# Patient Record
Sex: MALE | Race: WHITE | Employment: PART TIME | URBAN - METROPOLITAN AREA
[De-identification: names, ages, dates, MRNs, and addresses within clinical notes are randomized per-mention and may not be internally consistent; named-entity substitution may affect disease eponyms.]

---

## 2018-08-01 ENCOUNTER — TELEPHONE (OUTPATIENT)
Dept: PHARMACY | Facility: CLINIC | Age: 73
End: 2018-08-01

## 2018-08-01 DIAGNOSIS — Z71.89 ENCOUNTER FOR MEDICATION REVIEW AND COUNSELING: Primary | ICD-10-CM

## 2018-08-01 PROCEDURE — 1111F DSCHRG MED/CURRENT MED MERGE: CPT | Performed by: PHARMACIST

## 2018-08-01 RX ORDER — AMIODARONE HYDROCHLORIDE 200 MG/1
200 TABLET ORAL DAILY
COMMUNITY

## 2018-08-01 RX ORDER — ATENOLOL 25 MG/1
12.5 TABLET ORAL 2 TIMES DAILY
COMMUNITY

## 2018-08-01 RX ORDER — BUSPIRONE HYDROCHLORIDE 10 MG/1
10 TABLET ORAL 2 TIMES DAILY
COMMUNITY

## 2018-08-01 NOTE — TELEPHONE ENCOUNTER
CLINICAL PHARMACY NOTE - Post-Discharge Transitions of Care (DANA)  Patient outreach to review discharge medications and provide medication review and management. Spoke with patient. Non-face-to-face services provided:  Assessment and support for treatment adherence and medication management. SUBJECTIVE/OBJECTIVE:     Cleora Sandhoff is a 71 y.o. male discharged from [facility] on 7/16/18. Medications  Medication Sig Comment     Multiple Vitamins-Minerals (THERAPEUTIC MULTIVITAMIN-MINERALS) tablet Take 1 tablet by mouth daily     predniSONE (DELTASONE) 10 MG tablet Take 1 tablet by mouth 3 times daily as needed (Gout)     tadalafil (CIALIS) 20 MG tablet Take 1 tablet by mouth as needed for Erectile Dysfunction     LORazepam (ATIVAN) 0.5 MG tablet Take 1 tablet by mouth every 6 hours as needed for Anxiety Medication was switched     atenolol (TENORMIN) 25 MG tablet TAKE 1 TABLET TWICE A DAY Patient directed to take a half tablet twice daily     ULORIC 40 MG TABS tablet TAKE 1 TABLET DAILY     indomethacin (INDOCIN) 50 MG capsule Take 1 capsule by mouth as needed. Medication Discontinued    ELIQUIS 5 MG TABS tablet Take 5 mg by mouth 2 times daily.  propafenone (RYTHMOL) 150 MG tablet Take 150 mg by mouth every 8 hours. Medication was switched      Discontinued Medications  · Lorazepam 0.5 mg  · Indomethacin 50 mg   · Propafenone 150 mg     Additional Medications  · Buspirone 10 mg twice daily  · Amiodarone 200 mg twice daily     Allergies  No Known Allergies     Identified Potential Medication Interactions:     No clinically significant interactions identified via [employer] Interaction Analysis as category D or higher. Renal Dosing:     CrCl ~ 56 mL/min  eGFR: 58 mL/min/1.73 m^2  as of 10/20/17  No renal adjustments necessary. ASSESSMENT/PLAN:     Medication reconciliation completed. No voiced concerns or questions. Home medication list updated. No future appointments.     Xavier Pyle PharmD  PGY-1 Pharmacy Resident   8/1/2018  5:02 PM     I have discussed the care of this patient with the resident. I agree with the assessment and plan as documented.      Nigel TaylorD, [facility], [address]  Clinical Pharmacy Specialist  O: [phone]  C: [address] [place] [address]  [phone], Option 7    =======================================================    For Pharmacy Admin Tracking Only  TCM Call Made?: No  [facility] ([facility]) Select Patient?: Yes  Total # of Interventions Recommended: 1 - Updated Order #: 1  Total # Interventions Accepted: 1  Intervention Severity:   - Level 1 Intervention Present?: No   - Level 2 #: 0   - Level 3 #: 0  Outreach Status: Review Complete  Care Coordinator Outreach to Patient?: No  Provider Contacted?: No  Time Spent (min): 30

## 2019-06-30 ENCOUNTER — HOSPITAL ENCOUNTER (EMERGENCY)
Age: 74
Discharge: HOME | End: 2019-06-30
Attending: EMERGENCY MEDICINE
Payer: MEDICARE

## 2019-06-30 VITALS
BODY MASS INDEX: 33.23 KG/M2 | WEIGHT: 225 LBS | RESPIRATION RATE: 16 BRPM | DIASTOLIC BLOOD PRESSURE: 73 MMHG | SYSTOLIC BLOOD PRESSURE: 137 MMHG | HEART RATE: 73 BPM | TEMPERATURE: 98.6 F | OXYGEN SATURATION: 96 %

## 2019-06-30 DIAGNOSIS — L03.031 CELLULITIS OF TOE OF RIGHT FOOT: ICD-10-CM

## 2019-06-30 DIAGNOSIS — S99.921A INJURY OF TOE ON RIGHT FOOT, INITIAL ENCOUNTER: Primary | ICD-10-CM

## 2019-06-30 PROCEDURE — 99283 EMERGENCY DEPT VISIT LOW MDM: CPT

## 2019-06-30 PROCEDURE — 6370000000 HC RX 637 (ALT 250 FOR IP): Performed by: EMERGENCY MEDICINE

## 2019-06-30 RX ORDER — AMOXICILLIN AND CLAVULANATE POTASSIUM 875; 125 MG/1; MG/1
1 TABLET, FILM COATED ORAL 2 TIMES DAILY
Qty: 20 TABLET | Refills: 0 | Status: SHIPPED | OUTPATIENT
Start: 2019-06-30 | End: 2019-07-10

## 2019-06-30 RX ADMIN — Medication 1 EACH: at 10:03

## 2019-06-30 ASSESSMENT — PAIN DESCRIPTION - PAIN TYPE: TYPE: ACUTE PAIN

## 2019-06-30 ASSESSMENT — PAIN DESCRIPTION - LOCATION: LOCATION: TOE (COMMENT WHICH ONE)

## 2019-06-30 ASSESSMENT — PAIN DESCRIPTION - DESCRIPTORS: DESCRIPTORS: DISCOMFORT

## 2019-06-30 ASSESSMENT — PAIN DESCRIPTION - ORIENTATION: ORIENTATION: RIGHT

## 2019-06-30 ASSESSMENT — PAIN SCALES - GENERAL: PAINLEVEL_OUTOF10: 4

## 2022-09-08 ENCOUNTER — HOSPITAL ENCOUNTER (OUTPATIENT)
Age: 77
Setting detail: SPECIMEN
Discharge: HOME | End: 2022-09-08

## 2022-09-08 LAB
ALBUMIN SERPL-MCNC: 4.3 G/DL (ref 3.5–5.2)
ALBUMIN/GLOBULIN RATIO: 1.9 (ref 1–2.5)
ALP BLD-CCNC: 63 U/L (ref 40–129)
ALT SERPL-CCNC: 33 U/L (ref 5–41)
ANION GAP SERPL CALCULATED.3IONS-SCNC: 12 MMOL/L (ref 9–17)
AST SERPL-CCNC: 27 U/L
BILIRUB SERPL-MCNC: 0.5 MG/DL (ref 0.3–1.2)
BUN BLDV-MCNC: 12 MG/DL (ref 8–23)
CALCIUM SERPL-MCNC: 9.1 MG/DL (ref 8.6–10.4)
CHLORIDE BLD-SCNC: 101 MMOL/L (ref 98–107)
CO2: 25 MMOL/L (ref 20–31)
CREAT SERPL-MCNC: 1.05 MG/DL (ref 0.7–1.2)
GFR AFRICAN AMERICAN: >60 ML/MIN
GFR NON-AFRICAN AMERICAN: >60 ML/MIN
GFR SERPL CREATININE-BSD FRML MDRD: ABNORMAL ML/MIN/{1.73_M2}
GLUCOSE BLD-MCNC: 120 MG/DL (ref 70–99)
POTASSIUM SERPL-SCNC: 4.3 MMOL/L (ref 3.7–5.3)
PROSTATE SPECIFIC ANTIGEN: 2.85 NG/ML
SODIUM BLD-SCNC: 138 MMOL/L (ref 135–144)
TOTAL PROTEIN: 6.6 G/DL (ref 6.4–8.3)
URIC ACID: 5.1 MG/DL (ref 3.4–7)

## 2023-01-27 ENCOUNTER — APPOINTMENT (OUTPATIENT)
Dept: GENERAL RADIOLOGY | Age: 78
DRG: 502 | End: 2023-01-27
Payer: COMMERCIAL

## 2023-01-27 ENCOUNTER — ANESTHESIA EVENT (OUTPATIENT)
Dept: OPERATING ROOM | Age: 78
End: 2023-01-27
Payer: COMMERCIAL

## 2023-01-27 ENCOUNTER — APPOINTMENT (OUTPATIENT)
Dept: NON INVASIVE DIAGNOSTICS | Age: 78
DRG: 502 | End: 2023-01-27
Payer: COMMERCIAL

## 2023-01-27 ENCOUNTER — APPOINTMENT (OUTPATIENT)
Dept: MRI IMAGING | Age: 78
DRG: 502 | End: 2023-01-27
Payer: COMMERCIAL

## 2023-01-27 ENCOUNTER — HOSPITAL ENCOUNTER (INPATIENT)
Age: 78
LOS: 2 days | Discharge: HOME | DRG: 502 | End: 2023-01-29
Attending: EMERGENCY MEDICINE | Admitting: STUDENT IN AN ORGANIZED HEALTH CARE EDUCATION/TRAINING PROGRAM
Payer: COMMERCIAL

## 2023-01-27 DIAGNOSIS — S76.112A QUADRICEPS TENDON RUPTURE, LEFT, INITIAL ENCOUNTER: ICD-10-CM

## 2023-01-27 DIAGNOSIS — S76.112A RUPTURE OF LEFT QUADRICEPS MUSCLE, INITIAL ENCOUNTER: Primary | ICD-10-CM

## 2023-01-27 PROBLEM — Z01.810 PREOP CARDIOVASCULAR EXAM: Status: ACTIVE | Noted: 2023-01-27

## 2023-01-27 PROBLEM — I48.0 PAF (PAROXYSMAL ATRIAL FIBRILLATION) (HCC): Status: ACTIVE | Noted: 2023-01-27

## 2023-01-27 PROBLEM — I48.91 ATRIAL FIBRILLATION (HCC): Status: ACTIVE | Noted: 2023-01-27

## 2023-01-27 PROBLEM — Z86.79 HISTORY OF ATRIAL FIBRILLATION: Status: ACTIVE | Noted: 2023-01-27

## 2023-01-27 PROBLEM — G47.33 OSA (OBSTRUCTIVE SLEEP APNEA): Status: ACTIVE | Noted: 2023-01-27

## 2023-01-27 PROBLEM — Z98.890 S/P ABLATION OF ATRIAL FIBRILLATION: Status: ACTIVE | Noted: 2023-01-27

## 2023-01-27 PROBLEM — Z79.01 CHRONIC ANTICOAGULATION: Status: ACTIVE | Noted: 2023-01-27

## 2023-01-27 PROBLEM — Z86.79 S/P ABLATION OF ATRIAL FIBRILLATION: Status: ACTIVE | Noted: 2023-01-27

## 2023-01-27 PROBLEM — I51.7 LEFT VENTRICULAR HYPERTROPHY: Status: ACTIVE | Noted: 2023-01-27

## 2023-01-27 LAB
ANION GAP SERPL CALCULATED.3IONS-SCNC: 10 MMOL/L (ref 9–17)
BUN BLDV-MCNC: 18 MG/DL (ref 8–23)
BUN/CREAT BLD: 19 (ref 9–20)
CALCIUM SERPL-MCNC: 9.6 MG/DL (ref 8.6–10.4)
CHLORIDE BLD-SCNC: 103 MMOL/L (ref 98–107)
CO2: 25 MMOL/L (ref 20–31)
CREAT SERPL-MCNC: 0.97 MG/DL (ref 0.7–1.2)
EKG ATRIAL RATE: 71 BPM
EKG P AXIS: 34 DEGREES
EKG P-R INTERVAL: 166 MS
EKG Q-T INTERVAL: 392 MS
EKG QRS DURATION: 90 MS
EKG QTC CALCULATION (BAZETT): 425 MS
EKG R AXIS: 20 DEGREES
EKG T AXIS: 34 DEGREES
EKG VENTRICULAR RATE: 71 BPM
GFR SERPL CREATININE-BSD FRML MDRD: >60 ML/MIN/1.73M2
GLUCOSE BLD-MCNC: 132 MG/DL (ref 70–99)
HCT VFR BLD CALC: 45.6 % (ref 40.7–50.3)
HEMOGLOBIN: 15.8 G/DL (ref 13–17)
INR BLD: 1.2
LV EF: 65 %
LVEF MODALITY: NORMAL
MCH RBC QN AUTO: 31.7 PG (ref 25.2–33.5)
MCHC RBC AUTO-ENTMCNC: 34.6 G/DL (ref 28.4–34.8)
MCV RBC AUTO: 91.6 FL (ref 82.6–102.9)
NRBC AUTOMATED: 0 PER 100 WBC
PDW BLD-RTO: 12.5 % (ref 11.8–14.4)
PLATELET # BLD: 177 K/UL (ref 138–453)
PMV BLD AUTO: 9.6 FL (ref 8.1–13.5)
POTASSIUM SERPL-SCNC: 4.3 MMOL/L (ref 3.7–5.3)
PROTHROMBIN TIME: 14.8 SEC (ref 11.5–14.2)
RBC # BLD: 4.98 M/UL (ref 4.21–5.77)
SODIUM BLD-SCNC: 138 MMOL/L (ref 135–144)
WBC # BLD: 7 K/UL (ref 3.5–11.3)

## 2023-01-27 PROCEDURE — 93010 ELECTROCARDIOGRAM REPORT: CPT | Performed by: INTERNAL MEDICINE

## 2023-01-27 PROCEDURE — 71045 X-RAY EXAM CHEST 1 VIEW: CPT

## 2023-01-27 PROCEDURE — 99223 1ST HOSP IP/OBS HIGH 75: CPT | Performed by: INTERNAL MEDICINE

## 2023-01-27 PROCEDURE — 1200000000 HC SEMI PRIVATE

## 2023-01-27 PROCEDURE — 93005 ELECTROCARDIOGRAM TRACING: CPT | Performed by: EMERGENCY MEDICINE

## 2023-01-27 PROCEDURE — 6370000000 HC RX 637 (ALT 250 FOR IP): Performed by: STUDENT IN AN ORGANIZED HEALTH CARE EDUCATION/TRAINING PROGRAM

## 2023-01-27 PROCEDURE — 73564 X-RAY EXAM KNEE 4 OR MORE: CPT

## 2023-01-27 PROCEDURE — 36415 COLL VENOUS BLD VENIPUNCTURE: CPT

## 2023-01-27 PROCEDURE — 2580000003 HC RX 258: Performed by: STUDENT IN AN ORGANIZED HEALTH CARE EDUCATION/TRAINING PROGRAM

## 2023-01-27 PROCEDURE — 93306 TTE W/DOPPLER COMPLETE: CPT

## 2023-01-27 PROCEDURE — 99285 EMERGENCY DEPT VISIT HI MDM: CPT

## 2023-01-27 PROCEDURE — 6370000000 HC RX 637 (ALT 250 FOR IP): Performed by: NURSE PRACTITIONER

## 2023-01-27 PROCEDURE — 85027 COMPLETE CBC AUTOMATED: CPT

## 2023-01-27 PROCEDURE — 85610 PROTHROMBIN TIME: CPT

## 2023-01-27 PROCEDURE — 80048 BASIC METABOLIC PNL TOTAL CA: CPT

## 2023-01-27 PROCEDURE — 73721 MRI JNT OF LWR EXTRE W/O DYE: CPT

## 2023-01-27 PROCEDURE — 94761 N-INVAS EAR/PLS OXIMETRY MLT: CPT

## 2023-01-27 RX ORDER — ACETAMINOPHEN 325 MG/1
650 TABLET ORAL EVERY 6 HOURS PRN
Status: DISCONTINUED | OUTPATIENT
Start: 2023-01-27 | End: 2023-01-29 | Stop reason: HOSPADM

## 2023-01-27 RX ORDER — FEBUXOSTAT 40 MG/1
40 TABLET, FILM COATED ORAL DAILY
Status: DISCONTINUED | OUTPATIENT
Start: 2023-01-28 | End: 2023-01-29 | Stop reason: HOSPADM

## 2023-01-27 RX ORDER — SODIUM CHLORIDE 0.9 % (FLUSH) 0.9 %
10 SYRINGE (ML) INJECTION PRN
Status: DISCONTINUED | OUTPATIENT
Start: 2023-01-27 | End: 2023-01-29 | Stop reason: HOSPADM

## 2023-01-27 RX ORDER — POTASSIUM CHLORIDE 20 MEQ/1
40 TABLET, EXTENDED RELEASE ORAL PRN
Status: DISCONTINUED | OUTPATIENT
Start: 2023-01-27 | End: 2023-01-29 | Stop reason: HOSPADM

## 2023-01-27 RX ORDER — SODIUM CHLORIDE 9 MG/ML
INJECTION, SOLUTION INTRAVENOUS CONTINUOUS
Status: DISCONTINUED | OUTPATIENT
Start: 2023-01-27 | End: 2023-01-29

## 2023-01-27 RX ORDER — FAMOTIDINE 20 MG/1
20 TABLET, FILM COATED ORAL 2 TIMES DAILY
Status: DISCONTINUED | OUTPATIENT
Start: 2023-01-27 | End: 2023-01-29 | Stop reason: HOSPADM

## 2023-01-27 RX ORDER — LOSARTAN POTASSIUM 25 MG/1
25 TABLET ORAL DAILY
Status: DISCONTINUED | OUTPATIENT
Start: 2023-01-28 | End: 2023-01-29

## 2023-01-27 RX ORDER — ONDANSETRON 4 MG/1
4 TABLET, ORALLY DISINTEGRATING ORAL EVERY 8 HOURS PRN
Status: DISCONTINUED | OUTPATIENT
Start: 2023-01-27 | End: 2023-01-28 | Stop reason: SDUPTHER

## 2023-01-27 RX ORDER — HYDROCODONE BITARTRATE AND ACETAMINOPHEN 5; 325 MG/1; MG/1
1 TABLET ORAL EVERY 4 HOURS PRN
Status: DISCONTINUED | OUTPATIENT
Start: 2023-01-27 | End: 2023-01-28

## 2023-01-27 RX ORDER — BUSPIRONE HYDROCHLORIDE 5 MG/1
15 TABLET ORAL 2 TIMES DAILY
Status: DISCONTINUED | OUTPATIENT
Start: 2023-01-27 | End: 2023-01-29 | Stop reason: HOSPADM

## 2023-01-27 RX ORDER — SODIUM CHLORIDE 9 MG/ML
INJECTION, SOLUTION INTRAVENOUS PRN
Status: DISCONTINUED | OUTPATIENT
Start: 2023-01-27 | End: 2023-01-29 | Stop reason: HOSPADM

## 2023-01-27 RX ORDER — M-VIT,TX,IRON,MINS/CALC/FOLIC 27MG-0.4MG
1 TABLET ORAL DAILY
Status: DISCONTINUED | OUTPATIENT
Start: 2023-01-28 | End: 2023-01-29 | Stop reason: HOSPADM

## 2023-01-27 RX ORDER — SODIUM CHLORIDE 0.9 % (FLUSH) 0.9 %
10 SYRINGE (ML) INJECTION EVERY 12 HOURS SCHEDULED
Status: DISCONTINUED | OUTPATIENT
Start: 2023-01-27 | End: 2023-01-29 | Stop reason: HOSPADM

## 2023-01-27 RX ORDER — MORPHINE SULFATE 2 MG/ML
2 INJECTION, SOLUTION INTRAMUSCULAR; INTRAVENOUS EVERY 4 HOURS PRN
Status: DISCONTINUED | OUTPATIENT
Start: 2023-01-27 | End: 2023-01-29 | Stop reason: HOSPADM

## 2023-01-27 RX ORDER — POLYETHYLENE GLYCOL 3350 17 G/17G
17 POWDER, FOR SOLUTION ORAL DAILY PRN
Status: DISCONTINUED | OUTPATIENT
Start: 2023-01-27 | End: 2023-01-29 | Stop reason: HOSPADM

## 2023-01-27 RX ORDER — POTASSIUM CHLORIDE 7.45 MG/ML
10 INJECTION INTRAVENOUS PRN
Status: DISCONTINUED | OUTPATIENT
Start: 2023-01-27 | End: 2023-01-29 | Stop reason: HOSPADM

## 2023-01-27 RX ORDER — METOPROLOL SUCCINATE 50 MG/1
TABLET, EXTENDED RELEASE ORAL
COMMUNITY
Start: 2023-01-09

## 2023-01-27 RX ORDER — LOSARTAN POTASSIUM 25 MG/1
25 TABLET ORAL DAILY
Status: ON HOLD | COMMUNITY
Start: 2023-01-08 | End: 2023-01-29 | Stop reason: HOSPADM

## 2023-01-27 RX ORDER — MAGNESIUM SULFATE IN WATER 40 MG/ML
2000 INJECTION, SOLUTION INTRAVENOUS PRN
Status: DISCONTINUED | OUTPATIENT
Start: 2023-01-27 | End: 2023-01-29 | Stop reason: HOSPADM

## 2023-01-27 RX ORDER — ONDANSETRON 2 MG/ML
4 INJECTION INTRAMUSCULAR; INTRAVENOUS EVERY 6 HOURS PRN
Status: DISCONTINUED | OUTPATIENT
Start: 2023-01-27 | End: 2023-01-28 | Stop reason: SDUPTHER

## 2023-01-27 RX ORDER — BUSPIRONE HYDROCHLORIDE 15 MG/1
15 TABLET ORAL 2 TIMES DAILY
COMMUNITY
Start: 2022-11-12

## 2023-01-27 RX ORDER — ACETAMINOPHEN 650 MG/1
650 SUPPOSITORY RECTAL EVERY 6 HOURS PRN
Status: DISCONTINUED | OUTPATIENT
Start: 2023-01-27 | End: 2023-01-29 | Stop reason: HOSPADM

## 2023-01-27 RX ORDER — HYDROCODONE BITARTRATE AND ACETAMINOPHEN 5; 325 MG/1; MG/1
2 TABLET ORAL EVERY 4 HOURS PRN
Status: DISCONTINUED | OUTPATIENT
Start: 2023-01-27 | End: 2023-01-28

## 2023-01-27 RX ORDER — METOPROLOL SUCCINATE 50 MG/1
50 TABLET, EXTENDED RELEASE ORAL DAILY
Status: DISCONTINUED | OUTPATIENT
Start: 2023-01-28 | End: 2023-01-29 | Stop reason: HOSPADM

## 2023-01-27 RX ADMIN — FAMOTIDINE 20 MG: 20 TABLET, FILM COATED ORAL at 13:31

## 2023-01-27 RX ADMIN — HYDROCODONE BITARTRATE AND ACETAMINOPHEN 2 TABLET: 5; 325 TABLET ORAL at 13:31

## 2023-01-27 RX ADMIN — SODIUM CHLORIDE: 9 INJECTION, SOLUTION INTRAVENOUS at 12:02

## 2023-01-27 RX ADMIN — HYDROCODONE BITARTRATE AND ACETAMINOPHEN 2 TABLET: 5; 325 TABLET ORAL at 22:07

## 2023-01-27 RX ADMIN — BUSPIRONE HYDROCHLORIDE 15 MG: 5 TABLET ORAL at 20:46

## 2023-01-27 RX ADMIN — HYDROCODONE BITARTRATE AND ACETAMINOPHEN 2 TABLET: 5; 325 TABLET ORAL at 17:47

## 2023-01-27 RX ADMIN — FAMOTIDINE 20 MG: 20 TABLET, FILM COATED ORAL at 20:46

## 2023-01-27 ASSESSMENT — PAIN SCALES - GENERAL
PAINLEVEL_OUTOF10: 5
PAINLEVEL_OUTOF10: 8
PAINLEVEL_OUTOF10: 7
PAINLEVEL_OUTOF10: 5
PAINLEVEL_OUTOF10: 4
PAINLEVEL_OUTOF10: 0
PAINLEVEL_OUTOF10: 7

## 2023-01-27 ASSESSMENT — PAIN DESCRIPTION - LOCATION
LOCATION: KNEE
LOCATION: KNEE
LOCATION: LEG;KNEE
LOCATION: KNEE
LOCATION: KNEE

## 2023-01-27 ASSESSMENT — PAIN DESCRIPTION - PAIN TYPE
TYPE: ACUTE PAIN

## 2023-01-27 ASSESSMENT — PAIN DESCRIPTION - DESCRIPTORS
DESCRIPTORS: ACHING
DESCRIPTORS: SORE

## 2023-01-27 ASSESSMENT — PAIN - FUNCTIONAL ASSESSMENT
PAIN_FUNCTIONAL_ASSESSMENT: PREVENTS OR INTERFERES SOME ACTIVE ACTIVITIES AND ADLS
PAIN_FUNCTIONAL_ASSESSMENT: 0-10

## 2023-01-27 ASSESSMENT — PAIN DESCRIPTION - FREQUENCY
FREQUENCY: INTERMITTENT
FREQUENCY: CONTINUOUS
FREQUENCY: INTERMITTENT

## 2023-01-27 ASSESSMENT — PAIN DESCRIPTION - ORIENTATION
ORIENTATION: LEFT

## 2023-01-27 NOTE — ACP (ADVANCE CARE PLANNING)
Advance Care Planning     Advance Care Planning Activator (Inpatient)  Conversation Note      Date of ACP Conversation: 1/27/2023     Conversation Conducted with: Patient with Decision Making Capacity    ACP Activator: Jazmine Cortez, [id number] [address] Maker:     Current Designated Health Care Decision Maker:     Primary Decision Maker: Willie Agustin Child - [phone]    Secondary Decision Maker: Karina Lemus - [phone]    Today we documented Decision Maker(s). The patient will provide ACP documents. Care Preferences    Ventilation: \"If you were in your present state of health and suddenly became very ill and were unable to breathe on your own, what would your preference be about the use of a ventilator (breathing machine) if it were available to you? \"      Would the patient desire the use of ventilator (breathing machine)?: yes    \"If your health worsens and it becomes clear that your chance of recovery is unlikely, what would your preference be about the use of a ventilator (breathing machine) if it were available to you? \"     Would the patient desire the use of ventilator (breathing machine)?: No      Resuscitation  \"CPR works best to restart the heart when there is a sudden event, like a heart attack, in someone who is otherwise healthy. Unfortunately, CPR does not typically restart the heart for people who have serious health conditions or who are very sick. \"    \"In the event your heart stopped as a result of an underlying serious health condition, would you want attempts to be made to restart your heart (answer \"yes\" for attempt to resuscitate) or would you prefer a natural death (answer \"no\" for do not attempt to resuscitate)? \" no       [x] Yes   [] No   Educated Patient / Darreld Bosworth regarding differences between Advance Directives and portable DNR orders.     Length of ACP Conversation in minutes:  5    Conversation Outcomes:  [x] ACP discussion completed  [] Existing advance directive reviewed with patient; no changes to patient's previously recorded wishes  [] New Advance Directive completed  [x] Portable Do Not Rescitate prepared for Provider review and signature  [] POLST/POST/MOLST/MOST prepared for Provider review and signature      Follow-up plan:    [] Schedule follow-up conversation to continue planning  [] Referred individual to Provider for additional questions/concerns   [] Advised patient/agent/surrogate to review completed ACP document and update if needed with changes in condition, patient preferences or care setting    [] This note routed to one or more involved healthcare providers

## 2023-01-27 NOTE — PROGRESS NOTES
Comprehensive Nutrition Assessment    Type and Reason for Visit:  Initial    Nutrition Recommendations/Plan:   Continue current diet. Encourage protein foods for healing needs. Malnutrition Assessment:  Malnutrition Status:  Insufficient data (01/27/23 1500)    Context:  Acute Illness     Findings of the 6 clinical characteristics of malnutrition:  Energy Intake:  Unable to assess  Weight Loss:  No significant weight loss     Body Fat Loss:  Unable to assess     Muscle Mass Loss:  Unable to assess    Fluid Accumulation:  Mild Extremities (LLE)   Strength:  Not Performed    Nutrition Assessment:    Increased nutrient needs r/t acute injury/trauma aeb healing needs from pending surgery tomorrow. Glucose 132. No vitamin D level noted, would recommend to obtain one due to risk for deficiency. Pt having testing. Nutrition Related Findings:    unable to assess Wound Type: None       Current Nutrition Intake & Therapies:    Average Meal Intake: Unable to assess  Average Supplements Intake: None Ordered  Diet NPO  ADULT DIET; Regular    Anthropometric Measures:  Height: 6' (182.9 cm)  Ideal Body Weight (IBW): 178 lbs (81 kg)    Admission Body Weight: 240 lb (108.9 kg)  Current Body Weight: 240 lb (108.9 kg), 134.8 % IBW. Weight Source: Stated  Current BMI (kg/m2): 32.5  Usual Body Weight:  (unknown, limited historical weights)     Weight Adjustment For: No Adjustment                 BMI Categories: Obese Class 1 (BMI 30.0-34. 9)    Estimated Daily Nutrient Needs:  Energy Requirements Based On: Kcal/kg  Weight Used for Energy Requirements: Current  Energy (kcal/day): 1107-9244 (18-21)  Weight Used for Protein Requirements: Ideal  Protein (g/day): 105-121g (1.3-1.5g/kg)  Method Used for Fluid Requirements: 1 ml/kcal  Fluid (ml/day): 2300 ml    Nutrition Diagnosis:   Increased nutrient needs related to acute injury/trauma as evidenced by other (comment) (Pending surgery tomorrow)    Nutrition Interventions:   Food and/or Nutrient Delivery: Continue Current Diet  Nutrition Education/Counseling: No recommendation at this time  Coordination of Nutrition Care: Continue to monitor while inpatient  Plan of Care discussed with: no one    Goals:     Goals: Meet at least 75% of estimated needs     Recent Labs     01/27/23  1003      K 4.3      CO2 25   BUN 18   CREATININE 0.97   GLUCOSE 132*      Lab Results   Component Value Date/Time    LABALBU 4.3 09/08/2022 01:50 PM         Nutrition Monitoring and Evaluation:   Behavioral-Environmental Outcomes: None Identified  Food/Nutrient Intake Outcomes: Food and Nutrient Intake  Physical Signs/Symptoms Outcomes: Biochemical Data, Weight, Skin, Fluid Status or Edema    Discharge Planning:    Continue current diet     Olivia Escalona RD, LD  Contact: [phone]

## 2023-01-27 NOTE — CONSULTS
Spoke with Beth Carr at the bedside, he believes that he has a DNR order. After discussion states that he completed a [facility] with an  but never had a DNR order signed by a physician. Discussed full code vs DNR-CCA vs DNR-CC. States that he wishes to be a DNR-CCA at this time. Document signed by patient and left with primary nurse for physician review. Denies further needs at this time. Copy of [facility] requested when he is able.      [address]marie Woodson Nurse Coordinator  1/27/2023 4:15 PM

## 2023-01-27 NOTE — ED PROVIDER NOTES
[facility] ED  Emergency Department Encounter  Emergency Medicine Resident     Pt Name:Nicolas Flores  MRN: [id number]  [place] 1945  Date of evaluation: 1/27/23  PCP:  Rina Ruffin MD      [address] [place] [address]       Chief Complaint   Patient presents with    Knee Pain     Slipped and fell on ice injuring left knee. FROI       HISTORY OF PRESENT ILLNESS  (Location/Symptom, Timing/Onset, Context/Setting, Quality, Duration, Modifying Factors, Severity.)      Melly South is a 69 y.o. male who presents with mechanical slip and fall on ice. Landing directly on his left knee, brought in by EMS, has deformity noted, patient unable to extend at the left knee. Patient has a h/o atrial fibrillation, had ablation done 3 years ago, with success, is on eliquis. Did not his his head, and no LOC,    PAST MEDICAL / SURGICAL / SOCIAL / FAMILY HISTORY      has a past medical history of Erectile dysfunction, Gout, Hypertension, Obesity, and Unspecified sleep apnea. has a past surgical history that includes Colonoscopy and eye surgery (Left, 2010). Social History     Socioeconomic History    Marital status:      Spouse name: Not on file    Number of children: Not on file    Years of education: Not on file    Highest education level: Not on file   Occupational History    Not on file   Tobacco Use    Smoking status: Never    Smokeless tobacco: Never   Substance and Sexual Activity    Alcohol use:  Yes     Alcohol/week: 7.0 standard drinks     Types: 7 Standard drinks or equivalent per week    Drug use: No    Sexual activity: Not on file   Other Topics Concern    Not on file   Social History Narrative    Not on file     Social Determinants of Health     Financial Resource Strain: Not on file   Food Insecurity: Not on file   Transportation Needs: Not on file   Physical Activity: Not on file   Stress: Not on file   Social Connections: Not on file   Intimate Partner Violence: Not on file   Housing Stability: Not on file       Family History   Problem Relation Age of Onset    Arthritis Mother     High Blood Pressure Mother     Diabetes Father     High Blood Pressure Father     Arthritis Sister     Arthritis Sister        Allergies:  Diltiazem hcl    Home Medications:  Prior to Admission medications    Medication Sig Start Date End Date Taking? Authorizing Provider   atenolol (TENORMIN) 25 MG tablet Take 12.5 mg by mouth 2 times daily    Historical Provider, MD   busPIRone (BUSPAR) 10 MG tablet Take 10 mg by mouth 2 times daily    Historical Provider, MD   amiodarone (CORDARONE) 200 MG tablet Take 200 mg by mouth daily    Historical Provider, MD   Multiple Vitamins-Minerals (THERAPEUTIC MULTIVITAMIN-MINERALS) tablet Take 1 tablet by mouth daily    Historical Provider, MD   predniSONE (DELTASONE) 10 MG tablet Take 1 tablet by mouth 3 times daily as needed (Gout) 10/11/16   Que Tim MD   tadalafil (CIALIS) 20 MG tablet Take 1 tablet by mouth as needed for Erectile Dysfunction 9/1/16   Que Tim MD   ULORIC 40 MG TABS tablet TAKE 1 TABLET DAILY 5/13/16   Que Tim MD   ELIQUIS 5 MG TABS tablet Take 5 mg by mouth 2 times daily. 8/25/14   Historical Provider, MD         REVIEW OF SYSTEMS       Review of Systems   Musculoskeletal:  Positive for arthralgias, gait problem and joint swelling. PHYSICAL EXAM      INITIAL VITALS:   BP (!) 172/77   Pulse 81   Temp 97.6 °F (36.4 °C) (Oral)   Resp 16   SpO2 99%     Physical Exam  Constitutional:       Comments: Patient moving bilateral UE without difficulty and RLE without difficulty, no midline cervical thoracic or lumbar ttp. HENT:      Head: Normocephalic and atraumatic. Pulmonary:      Effort: Pulmonary effort is normal.   Abdominal:      General: There is no distension. Palpations: Abdomen is soft. There is no mass. Tenderness: There is no abdominal tenderness. There is no right CVA tenderness, left CVA tenderness, guarding or rebound.       Hernia: No hernia is present. Musculoskeletal:      Comments: Patient with defomity noted to the superior aspect of the patella with depression noted, patient is able to flex at his knee but unable to perform SLR, and unable to extend at the knee. There is some eccymosis developing on the distal medial thigh. Patella is low, and non tender to palpation, 2+ pedal pulse and SILT. DDX/DIAGNOSTIC RESULTS / EMERGENCY DEPARTMENT COURSE / MDM     Medical Decision Making  Quadriceps rupture likely complete, will plan on knee immobilization. Crutches, pain control, check xray to r/o fracture, patient does not seem to have additional injuries. Will speak with ortho regarding management      Amount and/or Complexity of Data Reviewed  Radiology: ordered. All EKG's are interpreted by the Emergency Department Physician who either signs or Co-signs this chart in the absence of a cardiologist.      ED Course as of 01/27/23 0956   Fri Jan 27, 2023   0925 XR KNEE LEFT (MIN 4 VIEWS)  Will speak with Dr. Chantel De La Cruz regarding management  [CE]   9698 Spoke with Dr. Chantel De La Cruz and discussed care, plan for admission and knee immobilizer, and preop labs and ekg, patient will need medical clearance. [CE]   [id number] Spoke with Dr. Billie Banks who accepted the patient for admisison [CE]   0956 EKG shows normal sinus rhythm with normal axis no ST elevations or depressions noted. Q waves noted in lead III. Ventricular rate of 71 KS interval of 166 QRS of 90 and a QT corrected of 425 [CE]      ED Course User Index  [address] [place] [address] DO Salma     pATIENT AND WIFE UPDATED ON RESULTS AND PLAN FOR ADMISSION AND LIKELY OR TOMORROW. CONSULTS:  IP CONSULT TO SOCIAL WORK  IP CONSULT TO ORTHOPEDIC SURGERY        FINAL IMPRESSION      1. Rupture of left quadriceps muscle, initial encounter          DISPOSITION / Westley [address] Admitted 01/27/2023 09:39:21 AM      PATIENT REFERRED TO:  No follow-up provider specified.     DISCHARGE MEDICATIONS:  New Prescriptions    No medications on file       Sagar Westbrook DO  Emergency Medicine Resident    (Please note that portions of thisnote were completed with a voice recognition program.  Efforts were made to edit the dictations but occasionally words are mis-transcribed.)        Barbie Cam DO  01/27/23 5412

## 2023-01-27 NOTE — ED NOTES
Left message for Dr Darci Ye ortho on call per Dr. Woodruff Julia request.     Nina DOUGLASS Reser  01/27/23 9319

## 2023-01-27 NOTE — CONSULTS
Department of Orthopedic Surgery  Attending Consult Note        Reason for Consult:  Left quadriceps tendon rupture     CHIEF COMPLAINT:  Fall, left knee pain    History Obtained From:  patient    HISTORY OF PRESENT ILLNESS:                The patient is a 69 y.o. male who presented to the ER earlier today after a fall from slipping on the ice. Patient had immediate left knee pain and inability to ambulate/elevate the knee. Clinical exam was consistent with quadricep tendon rupture. X-rays were negative for fracture, but demonstrated soft tissue changes and concern for quadriceps tendon rupture. Patient was subsequently admitted, MRI ordered. Patient normally is very ambulatory, walking several miles per day. He states that overall he is active. He denies any significant prior left knee issues. He denies any other known injuries at this time. Patient lives with his wife.     Past Medical History:        Diagnosis Date    Atrial fibrillation (HCC)     Erectile dysfunction     Gout     Hypertension     Obesity     SHELIA (obstructive sleep apnea)     PAF (paroxysmal atrial fibrillation) ([facility] [address]     Unspecified sleep apnea      Past Surgical History:        Procedure Laterality Date    CARDIAC ELECTROPHYSIOLOGY STUDY AND ABLATION  03/2020    [facility]    COLONOSCOPY      EYE SURGERY Left 01/01/2010    detached retina     Current Medications:   Current Facility-Administered Medications: 0.9 % sodium chloride infusion, , IntraVENous, Continuous  sodium chloride flush 0.9 % injection 10 mL, 10 mL, IntraVENous, 2 times per day  sodium chloride flush 0.9 % injection 10 mL, 10 mL, IntraVENous, PRN  0.9 % sodium chloride infusion, , IntraVENous, PRN  ondansetron (ZOFRAN-ODT) disintegrating tablet 4 mg, 4 mg, Oral, Q8H PRN **OR** ondansetron (ZOFRAN) injection 4 mg, 4 mg, IntraVENous, Q6H PRN  polyethylene glycol (GLYCOLAX) packet 17 g, 17 g, Oral, Daily PRN  famotidine (PEPCID) tablet 20 mg, 20 mg, Oral, BID  acetaminophen (TYLENOL) tablet 650 mg, 650 mg, Oral, Q6H PRN **OR** acetaminophen (TYLENOL) suppository 650 mg, 650 mg, Rectal, Q6H PRN  potassium chloride (KLOR-CON M) extended release tablet 40 mEq, 40 mEq, Oral, PRN **OR** potassium bicarb-citric acid (EFFER-K) effervescent tablet 40 mEq, 40 mEq, Oral, PRN **OR** potassium chloride 10 mEq/100 mL IVPB (Peripheral Line), 10 mEq, IntraVENous, PRN  magnesium sulfate 2000 mg in 50 mL IVPB premix, 2,000 mg, IntraVENous, PRN  busPIRone (BUSPAR) tablet 15 mg, 15 mg, Oral, BID  [Held by provider] apixaban (ELIQUIS) tablet 5 mg, 5 mg, Oral, BID  [START ON 1/28/2023] losartan (COZAAR) tablet 25 mg, 25 mg, Oral, Daily  [START ON 1/28/2023] metoprolol succinate (TOPROL XL) extended release tablet 50 mg, 50 mg, Oral, Daily  [START ON 1/28/2023] therapeutic multivitamin-minerals 1 tablet, 1 tablet, Oral, Daily  [START ON 1/28/2023] febuxostat (ULORIC) tablet 40 mg (PATIENT SUPPLIED) (Patient Supplied), 40 mg, Oral, Daily  HYDROcodone-acetaminophen (NORCO) 5-325 MG per tablet 1 tablet, 1 tablet, Oral, Q4H PRN **OR** HYDROcodone-acetaminophen (NORCO) 5-325 MG per tablet 2 tablet, 2 tablet, Oral, Q4H PRN  morphine (PF) injection 2 mg, 2 mg, IntraVENous, Q4H PRN  Allergies:  Diltiazem hcl    Social History:   Lives with wife    Family History:       Problem Relation Age of Onset    Arthritis Mother     High Blood Pressure Mother     Diabetes Father     High Blood Pressure Father     Arthritis Sister     Arthritis Sister      REVIEW OF SYSTEMS:    Negative except as above    PHYSICAL EXAM:    VITALS:  BP (!) 163/90   Pulse 77   Temp 97.1 °F (36.2 °C) (Temporal)   Resp 20   Ht 6' (1.829 m)   Wt 240 lb (108.9 kg)   SpO2 98%   BMI 32.55 kg/m²     General: Patient is alert and oriented. No acute distress. Resting comfortably in the hospital bed. Left knee: Immobilizer was opened. No evidence for any open injury or soft tissue abrasion. Obvious joint effusion noted. Defect palpable at the superior patella/distal quadriceps tendon. Patient unable to elevate the leg and extend the knee. Sensation intact distally in the saphenous, sural, tibial, superficial/deep peroneal nerves. Motor function intact with ankle dorsiflexion/plantarflexion, wiggling the toes. Distal pulses are intact. Compartments are soft and compressible. DATA:    X-ray left knee:  1. No acute osseous abnormality. 2.  Irregular suprapatellar soft tissues and distal quadriceps tendon shadow,   concerning for soft tissue injury, possible distal quadriceps tendon tear. Correlation with physical exam is recommended. If there is concern for   quadriceps tendon rupture, consider MRI. MRI left knee:  1. Complete rupture of the left distal quadriceps tendon with approximately 2   cm retraction and pronounced surrounding soft tissue edema and confluent   fluid within the anterior soft tissues. Associated muscular strains of the   distal quadriceps musculature also noted. Underlying tendinosis of the   extensor mechanism as well as avulsion fracture of the enthesophyte at the   quadriceps insertion on the patella. 2. Mildly complex tear of the anterior horn body junction of the lateral   meniscus with extension into the anterior root attachment. 3. Knee effusion. 4. Mild tricompartmental osteoarthritis       IMPRESSION/RECOMMENDATIONS:    1. Acute left distal quadricep tendon rupture    Diagnosis for this patient was discussed with him in detail. Imaging has been reviewed. Labs were reviewed. Patient has a left distal quadricep tendon rupture with retinacular disruption. We discussed surgical and nonsurgical treatment options. Surgical treatment is recommended with left distal quadricep tendon repair. We discussed risks, benefits, and alternatives to surgery.   Risks to surgery include bleeding, infection, damage to showing neurovascular and soft tissue structures, pain, stiffness, weakness, implant failure, need for further treatment, adverse reaction anesthesia, DVT/PE, rerupture, loss of limb, loss of life. The patient had all questions answered to his satisfaction, and he elected to proceed with surgery. Written and informed consent was obtained. We we will plan to proceed with surgical treatment tomorrow morning pending medical clearance. Cardiac clearance has been ordered. Patient also has evidence of meniscus tears seen on MRI. These can be addressed at a later date if needed. Hold Eliquis  N.p.o. after midnight  Maintain knee in extension    Please call with any questions or concerns.

## 2023-01-27 NOTE — ED NOTES
Increased swelling noted on left knee, ice applied to affected area.      Ilene Gomez RN  01/27/23 1024

## 2023-01-27 NOTE — H&P
History and Physical    Patient:  Jory Dupont  MRN: [id number]    Chief Complaint:  left knee pain    History Obtained From:  patient, electronic medical record    PCP: Gabriel Luis MD    History of Present Illness: The patient is a 69 y.o. male who presented to the ER with complaints of left knee pain after sustaining a fall. He stated he slipped on the ice and fell directly on the left knee. EMS transported him and was found to have a deformity and unable to extend at the knee level or bare weight. He denied LOC. He is on Eliquis for Afib and has history of HTN. Radiology studies were concerning for left Quad Rupture and Dr. Addison Wan was called by ER . Left leg placed in a brace and admitted    Past Medical History:        Diagnosis Date    Atrial fibrillation [facility])     Erectile dysfunction     Gout     Hypertension     Obesity     Unspecified sleep apnea        Past Surgical History:        Procedure Laterality Date    CARDIAC ELECTROPHYSIOLOGY STUDY AND ABLATION  03/2020    [facility]    COLONOSCOPY      EYE SURGERY Left 01/01/2010    detached retina       Medications Prior to Admission:    Prior to Admission medications    Medication Sig Start Date End Date Taking?  Authorizing Provider   metoprolol succinate (TOPROL XL) 50 MG extended release tablet TAKE 1 TABLET DAILY 1/9/23  Yes Historical Provider, MD   losartan (COZAAR) 25 MG tablet Take 25 mg by mouth daily 1/8/23  Yes Historical Provider, MD   busPIRone (BUSPAR) 15 MG tablet Take 15 mg by mouth 2 times daily 11/12/22  Yes Historical Provider, MD   Multiple Vitamins-Minerals (THERAPEUTIC MULTIVITAMIN-MINERALS) tablet Take 1 tablet by mouth daily    Historical Provider, MD   predniSONE (DELTASONE) 10 MG tablet Take 1 tablet by mouth 3 times daily as needed (Gout) 10/11/16   Gabriel Luis MD   tadalafil (CIALIS) 20 MG tablet Take 1 tablet by mouth as needed for Erectile Dysfunction 9/1/16   Gabriel Luis MD   ULORIC 40 MG TABS tablet TAKE 1 TABLET DAILY 5/13/16   Vincent Joyner MD   ELIQUIS 5 MG TABS tablet Take 5 mg by mouth 2 times daily. 8/25/14   Historical Provider, MD       Allergies:  Diltiazem hcl    Social History:   TOBACCO:   reports that he has never smoked. He has never used smokeless tobacco.  ETOH:   reports current alcohol use of about 7.0 standard drinks per week. Family History:       Problem Relation Age of Onset    Arthritis Mother     High Blood Pressure Mother     Diabetes Father     High Blood Pressure Father     Arthritis Sister     Arthritis Sister        Allergies:  Diltiazem hcl    Medications Prior to Admission:    Prior to Admission medications    Medication Sig Start Date End Date Taking? Authorizing Provider   metoprolol succinate (TOPROL XL) 50 MG extended release tablet TAKE 1 TABLET DAILY 1/9/23  Yes Historical Provider, MD   losartan (COZAAR) 25 MG tablet Take 25 mg by mouth daily 1/8/23  Yes Historical Provider, MD   busPIRone (BUSPAR) 15 MG tablet Take 15 mg by mouth 2 times daily 11/12/22  Yes Historical Provider, MD   Multiple Vitamins-Minerals (THERAPEUTIC MULTIVITAMIN-MINERALS) tablet Take 1 tablet by mouth daily    Historical Provider, MD   predniSONE (DELTASONE) 10 MG tablet Take 1 tablet by mouth 3 times daily as needed (Gout) 10/11/16   Vincent Joyner MD   tadalafil (CIALIS) 20 MG tablet Take 1 tablet by mouth as needed for Erectile Dysfunction 9/1/16   Vincent Joyner MD   ULORIC 40 MG TABS tablet TAKE 1 TABLET DAILY 5/13/16   Vincent Joyner MD   ELIQUIS 5 MG TABS tablet Take 5 mg by mouth 2 times daily. 8/25/14   Historical Provider, MD       Review of Systems:  Constitutional:negative  for fevers, and negative for chills.   Eyes: negative for visual disturbance   ENT: negative for sore throat, negative nasal congestion, and negative for earache  Respiratory: negative for shortness of breath, negative for cough, and negative for wheezing  Cardiovascular: negative for chest pain, negative for palpitations, and negative for syncope  Gastrointestinal: negative for abdominal pain, negative for nausea,negative for vomiting, negative for diarrhea, negative for constipation, and negative for hematochezia or melena  Genitourinary: negative for dysuria, negative for urinary urgency, negative for urinary frequency, and negative for hematuria  Skin: negative for skin rash, and negative for skin lesions  Neurological: negative for unilateral weakness, numbness or tingling. Physical Exam:    Vitals:   Temp: 97.1 °F (36.2 °C)  BP: (!) 163/90  Resp: 20  Heart Rate: 77  SpO2: 98 %  24HR INTAKE/OUTPUT:    Intake/Output Summary (Last 24 hours) at 1/27/2023 1208  Last data filed at 1/27/2023 1152  Gross per 24 hour   Intake --   Output 425 ml   Net -425 ml       Weight      Body mass index is 32.55 kg/m². Exam:  GEN:    Awake, alert and oriented x3. EYES:  EOMI, pupils equal   NECK: Supple. No lymphadenopathy. No carotid bruit  CVS:    regular rate and rhythm, no audible murmur  PULM:  CTA, no wheezes, rales or rhonchi, no acute respiratory distress  ABD:    Bowels sounds normal.  Abdomen is soft. No distention. no tenderness to palpation. EXT:   no edema bilaterally . No calf tenderness. [place] Brace in place to LLE, pedal pulses palpable  NEURO: Moves all extremities. Motor and sensory are grossly intact  SKIN:  No rashes.   No skin lesions.    -----------------------------------------------------------------  Diagnostic Data:     DATA:    CBC:   Lab Results   Component Value Date    WBC 7.0 01/27/2023    RBC 4.98 01/27/2023    HGB 15.8 01/27/2023    HCT 45.6 01/27/2023    MCV 91.6 01/27/2023     01/27/2023        CMP:   Lab Results   Component Value Date    GLUCOSE 132 (H) 01/27/2023    BUN 18 01/27/2023    CREATININE 0.97 01/27/2023     01/27/2023    K 4.3 01/27/2023    CALCIUM 9.6 01/27/2023     01/27/2023    CO2 25 01/27/2023    PROT 6.6 09/08/2022    LABALBU 4.3 09/08/2022    BILITOT 0.5 09/08/2022    ALKPHOS 63 09/08/2022    ALT 33 09/08/2022    AST 27 09/08/2022       UA:   No results found for: COLORU, CLARITYU, SPECGRAV, WBCUA, RBCUA, EPITHUA, LEUKOCYTESUR, GLUCOSEU, BLOODU, KETUA, PROTEINU, HGBUR, CASTUA, CRYSTUA, BACTERIA, YEAST    Lactic Acid:   No results found for: LACTA    D-Dimer:  No results found for: DDIMER    PT/INR:  Lab Results   Component Value Date/Time    PROTIME 14.8 01/27/2023 10:03 AM    INR 1.2 01/27/2023 10:03 AM       High Sensitivity Troponin:  No results for input(s): TROPHS in the last 72 hours. ABGs:   No results found for: PHART, PH, BHE4ESE, PCO2, PO2ART, PO2, QKU3IHH, HCO3, BEART, BE, THGBART, THB, MZR1UQY, X5VCDJYB, O2SAT, FIO2        XR KNEE LEFT (MIN 4 VIEWS)   Preliminary Result   1. No acute osseous abnormality. 2.  Irregular suprapatellar soft tissues and distal quadriceps tendon shadow,   concerning for soft tissue injury, possible distal quadriceps tendon tear. Correlation with physical exam is recommended. If there is concern for   quadriceps tendon rupture, consider MRI. Assessment:    Principal Problem:    Quadriceps tendon rupture, left, initial encounter  Active Problems:    PAF (paroxysmal atrial fibrillation) ([facility])    SHELIA (obstructive sleep apnea)    Essential hypertension  Resolved Problems:    * No resolved hospital problems.  *      Patient Active Problem List    Diagnosis Date Noted    Quadriceps tendon rupture, left, initial encounter 01/27/2023    PAF (paroxysmal atrial fibrillation) ([facility] [address] 01/27/2023    SHELIA (obstructive sleep apnea) 01/27/2023    Essential hypertension 09/03/2015    Gout 02/01/2013    Palpitations 02/01/2013    ED (erectile dysfunction) 02/01/2013       Plan:     MEDICAL DECISION MAKING:    Primary Problem(s): Quadriceps tendon rupture, left, initial encounter  Differential diagnoses: fracture  Condition is an undiagnosed new problem with uncertain prognosis  Condition is stable  Treatment plan: Consultation:  Mishel  Imaging: MRI left knee WO contrast ordered  Medications:   Hold Eliquis  Norco and MS for pain  Medication Monitoring / High Risk Medications: none   Immobilizer in place  NPO after midnight     PAF    Condition is a chronic stable condition  Treatment plan: Consultation: Cardiology for surgery clearance  Imaging: Echo ordered  Medications:   Continue Toprol XL  Hold Eliquis for Surgery      HTN    Condition is a chronic stable condition  Treatment plan: Continue current treatment  Imaging: no further imaging studies ordered today  Medications:   Continue Losartan, Toprol XL    Nutrition status:   obesity, non-morbid  Dietician consult initiated    Hospital Prophylaxis:   DVT:  Eliquis/SCD    Stress Ulcer: H2 Blocker     MDM Data:   Test interpretation:  My independent EKG interpretation: normal sinus rhythm  My independent X-ray interpretation:  CXR shows no acute process  Management and/or test interpretation discussed with ER MD at time of admission  Consults and Nursing notes were personally reviewed, all current labs and imaging were personally reviewed, tests ordered: CBC, BMP, and history obtained by independent historian       Disposition:  Shared decision making: All test results, treatment options and disposition options were discussed with the patient today  Social determinants of health that may impact management: none  Code status: Full Code   Disposition: Discharge plan is pending        Critical Care Time:  Total critical care time caring for this patient with life threatening, unstable organ failure, including direct patient contact, management of life support systems, review of data including imaging and labs, discussions with other team members and physicians at least 0 minutes so far today, excluding separately billable procedures.       [facility] Medication Reconciliation documentation:    [x] I have utilized all available immediate resources to obtain, update, or review the patient's current medications (including all prescriptions, over-the-counter products, herbals, cannabinoid products and bitamin/mineral/dietary/nutritional supplements. Jamshid.Havers 'yes\", Edwin Marlow     []  The patient is not eligible for medication reconciliation; the patient is in an emergent medical situation where delaying treatment would jeopardize the patient's health    []  I did NOT confirm, update or review the patient's current list of medications today. [DOES NOT SATISFY [facility] PERFORMANCE]        [facility] Advanced Care Planning documentation:  [x] I have confirmed that the patient's Advance Care Plan is present, Code Status is documented, or surrogate decision maker is listed in the patient's medical record  [If \"yes\", STOP HERE]     [] The patient's Advance Care Plan is NOT present because:    []  I confirmed today that the patient does not wish or was not able to name a   surrogate decision maker or provide and advance care plan.    [] Hospice care is currently being provided or has been provided within the   calendar year. []  I did NOT confirm today the presence of an [address] or surrogate   decision maker documented within the patient's medical record. [DOES NOT SATISFY [facility] PERFORMANCE]    CORE MEASURES  DVT prophylaxis:  SCD; Eliquis  Decubitus ulcer present on admission: No  CODE STATUS: FULL CODE  Nutrition Status: good   Physical therapy: Yes   Old Charts reviewed: Yes  EKG Reviewed:  Yes  Advance Directive Addressed: Yes    ZAIRA Kumar CNP, ZAIRA, NP-C  1/27/2023, 12:08 PM

## 2023-01-27 NOTE — PROGRESS NOTES
[facility]  Inpatient/Observation/Outpatient Rehabilitation    Date: 2023  Patient Name: Natacha Rice       [x] Inpatient Acute/Observation       []  Outpatient  : 1945       [] Pt no showed for scheduled appointment    [] Pt refused/declined therapy at this time due to:           [x] Pt cancelled due to:  [] No Reason Given   [] Sick/ill   [] Other: Pt getting MRI and to have surgery tomorrow. Therapist/Assistant will attempt to see this patient, at our earliest opportunity.        Lenard Coleman, PT, DPT Date: 2023

## 2023-01-27 NOTE — PROGRESS NOTES
[facility]  Inpatient/Observation/Outpatient Rehabilitation    Date: 2023  Patient Name: Gene Roa       [x] Inpatient Acute/Observation       []  Outpatient  : 1945       [] Pt no showed for scheduled appointment    [] Pt refused/declined therapy at this time due to:           [x] Pt not seen due to:  [] No Reason Given   [] Sick/ill   [] Other: Pt out of room for an MRI at this time and will have surgery tomorrow. Therapist/Assistant will attempt to see this patient, at our earliest opportunity.        Valdez Olvera, OT Date: 2023

## 2023-01-27 NOTE — PROGRESS NOTES
Spoke to Dr. Chantel De La Cruz about pt, states he can eat today and NPO at midnight. Needs MRI ordered and medical clearance. Told pt he could eat if he wanted, not hungry. Told him I will put a diet order in if he wants to eat yet today. Spoke to CNP about not seeing an MRI ordered at this time she is going to order it. Inpatient consult called to Cardiology for surgical clearance.

## 2023-01-27 NOTE — PROGRESS NOTES
Pt arrived to floor via ER bed, was able to scoot over to room bed with minimal assistance. Immobilizer in place. Pt wanted to use urinal as soon as he got in room, he was able to stand alone and side of bed on R leg and use urinal. Admission nurse in right after to do navigator. SCD's applied, IV fluids started. Call light and personal belongings in reach, introduced to white board, and staff. Will continue to monitor.

## 2023-01-27 NOTE — CONSULTS
Griselda Rutledge am scribing for and in the presence of Lena Lane MD, F.A.C.C..    Patient: Kathy Harris  : 1945  Date of Admission: 2023  Primary Care Physician: Enedelia Green  Today's Date: 2023    REASON FOR CONSULTATION: Knee Pain (Slipped and fell on ice injuring left knee. FROI)      History of Present Illness:           I had the pleasure of seeing your patient Kathy Harris as part of a pre-operative cardiac evaluation today. He is planning on having knee surgery done tomorrow. He today fell down while in the parking lot on a sheet of ice. EMS was called. He denied any loss of consciousness. He does have a history of A fib ablation x2 one done at [facility] and the 2nd done at the [facility] back in 2019. He is maintaining sinus rhythm since his last ablation. He has never had a coronary angiography done. No history of CAD, myocardial infarction or heart failure. He had history of unremarkable stress test prior to his first ablation. He is following up with Dr. Alma Rosa Cook in [facility] for his cardiac condition. He does have SHELIA and he uses a machine nightly. He also has history of hypertensions. No diabetes. No history of dyslipidemia. No family history of premature CAD. He is lifelong non-smoker. He denied any current or recent chest pain, shortness of breath, increased lower extremity edema, abdominal pain, bleeding problems, problems with his medications or any other concerns at this time. No abdominal pain, nausea or vomiting. He is very active at work. He walks about two miles a day while at work. He does report eating and drinking well. Bleeding Risks: Mr. Gwen Wheat denies any current or recent bleeding problems including a history of a GI bleed, ulcers, recent or upcoming surgeries, blood in his stool or black tarry stools or blood in his urine. ECG 2023: NSR, no acute ischemic changes.      Echo 2023: EF is normal. Moderately increased left ventricular wall thickness. Mild aortic and tricuspid regurgitation. Mild diastolic dysfunction. MRI of the left Knee: Complete rupture of the distal quadriceps tendon with approximately 2 cm retraction and pronounced surrounding soft tissue edema. Mild complex tear of the anterior horn body junction of the lateral meniscus with extension into the anterior root attachment. Knee effusion. Mild tricompartmental osteoarthritis of the left knee. PAST MEDICAL HISTORY:        Past Medical History:   Diagnosis Date    Atrial fibrillation (HCC)     Erectile dysfunction     Gout     Hypertension     Obesity     SHELIA (obstructive sleep apnea)     PAF (paroxysmal atrial fibrillation) ([facility] [address]     Unspecified sleep apnea        CURRENT ALLERGIES: Diltiazem hcl REVIEW OF SYSTEMS: 14 systems were reviewed. Pertinent positives and negatives as above, all else negative. Past Surgical History:   Procedure Laterality Date    CARDIAC ELECTROPHYSIOLOGY STUDY AND ABLATION  03/2020    [facility]    COLONOSCOPY      EYE SURGERY Left 01/01/2010    detached retina    Social History:  Social History     Tobacco Use    Smoking status: Never    Smokeless tobacco: Never   Substance Use Topics    Alcohol use:  Yes     Alcohol/week: 7.0 standard drinks     Types: 7 Standard drinks or equivalent per week    Drug use: No        MEDICATIONS:  0.9 % sodium chloride infusion, Continuous  sodium chloride flush 0.9 % injection 10 mL, 2 times per day  sodium chloride flush 0.9 % injection 10 mL, PRN  0.9 % sodium chloride infusion, PRN  ondansetron (ZOFRAN-ODT) disintegrating tablet 4 mg, Q8H PRN   Or  ondansetron (ZOFRAN) injection 4 mg, Q6H PRN  polyethylene glycol (GLYCOLAX) packet 17 g, Daily PRN  famotidine (PEPCID) tablet 20 mg, BID  acetaminophen (TYLENOL) tablet 650 mg, Q6H PRN   Or  acetaminophen (TYLENOL) suppository 650 mg, Q6H PRN  potassium chloride (KLOR-CON M) extended release tablet 40 mEq, PRN   Or  potassium bicarb-citric acid (EFFER-K) effervescent tablet 40 mEq, PRN   Or  potassium chloride 10 mEq/100 mL IVPB (Peripheral Line), PRN  magnesium sulfate 2000 mg in 50 mL IVPB premix, PRN  busPIRone (BUSPAR) tablet 15 mg, BID  [Held by provider] apixaban (ELIQUIS) tablet 5 mg, BID  [START ON 1/28/2023] losartan (COZAAR) tablet 25 mg, Daily  [START ON 1/28/2023] metoprolol succinate (TOPROL XL) extended release tablet 50 mg, Daily  [START ON 1/28/2023] therapeutic multivitamin-minerals 1 tablet, Daily  [START ON 1/28/2023] febuxostat (ULORIC) tablet 40 mg (PATIENT SUPPLIED) (Patient Supplied), Daily  HYDROcodone-acetaminophen (NORCO) 5-325 MG per tablet 1 tablet, Q4H PRN   Or  HYDROcodone-acetaminophen (NORCO) 5-325 MG per tablet 2 tablet, Q4H PRN  morphine (PF) injection 2 mg, Q4H PRN        CURRENT INPATIENT MEDICATIONS:  Prior to Admission medications    Medication Sig Start Date End Date Taking? Authorizing Provider   metoprolol succinate (TOPROL XL) 50 MG extended release tablet TAKE 1 TABLET DAILY 1/9/23  Yes Historical Provider, MD   losartan (COZAAR) 25 MG tablet Take 25 mg by mouth daily 1/8/23  Yes Historical Provider, MD   busPIRone (BUSPAR) 15 MG tablet Take 15 mg by mouth 2 times daily 11/12/22  Yes Historical Provider, MD   Multiple Vitamins-Minerals (THERAPEUTIC MULTIVITAMIN-MINERALS) tablet Take 1 tablet by mouth daily    Historical Provider, MD   predniSONE (DELTASONE) 10 MG tablet Take 1 tablet by mouth 3 times daily as needed (Gout) 10/11/16   Zaynab Pearl MD   tadalafil (CIALIS) 20 MG tablet Take 1 tablet by mouth as needed for Erectile Dysfunction 9/1/16   Zaynab Pearl MD   ULORIC 40 MG TABS tablet TAKE 1 TABLET DAILY 5/13/16   Zaynab Pearl MD   ELIQUIS 5 MG TABS tablet Take 5 mg by mouth 2 times daily.  8/25/14   Historical Provider, MD      sodium chloride flush  10 mL IntraVENous 2 times per day    famotidine  20 mg Oral BID    busPIRone  15 mg Oral BID    [Held by provider] apixaban  5 mg Oral BID [START ON 1/28/2023] losartan  25 mg Oral Daily    [START ON 1/28/2023] metoprolol succinate  50 mg Oral Daily    [START ON 1/28/2023] therapeutic multivitamin-minerals  1 tablet Oral Daily    [START ON 1/28/2023] febuxostat  40 mg Oral Daily       FAMILY HISTORY: family history includes Arthritis in his mother, sister, and sister; Diabetes in his father; High Blood Pressure in his father and mother. Physical Examination:     BP (!) 162/93   Pulse 81   Temp 97.6 °F (36.4 °C) (Temporal)   Resp 16   Ht 6' (1.829 m)   Wt 240 lb (108.9 kg)   SpO2 95%   BMI 32.55 kg/m²  Body mass index is 32.55 kg/m². Constitutional: He is oriented to person, place, and time. He appears well-developed and well-nourished. In no acute distress. HEENT: Normocephalic and atraumatic. No JVD present. Carotid bruit is not present. No mass and no thyromegaly present. No lymphadenopathy present. Cardiovascular: Normal rate, regular rhythm, normal heart sounds. Exam reveals no gallop and no friction rubs. No murmur was heard. .   Pulmonary/Chest: Effort normal and breath sounds normal. No respiratory distress. He has no wheezes, rhonchi or rales. Abdominal: Soft, non-tender. Bowel sounds and aorta are normal. He exhibits no organomegaly, mass or bruit. Extremities: None. No cyanosis or clubbing. 2+ radial and carotid pulses. Distal extremity pulses: 2+ bilaterally. .  Neurological: He is alert and oriented to person, place, and time. No evidence of gross cranial nerve deficit. Coordination appeared normal.   Skin: Skin is warm and dry. There is no rash or diaphoresis. Psychiatric: He has a normal mood and affect.  His speech is normal and behavior is normal.      MOST RECENT LABS ON RECORD:   Lab Results   Component Value Date    WBC 7.0 01/27/2023    HGB 15.8 01/27/2023    HCT 45.6 01/27/2023     01/27/2023    CHOL 178 02/23/2016    TRIG 116 02/23/2016    HDL 52 02/23/2016    ALT 33 09/08/2022    AST 27 09/08/2022    NA 138 01/27/2023    K 4.3 01/27/2023     01/27/2023    CREATININE 0.97 01/27/2023    BUN 18 01/27/2023    CO2 25 01/27/2023    PSA 2.85 09/08/2022    INR 1.2 01/27/2023       ASSESSMENT:     Patient Active Problem List    Diagnosis Date Noted    Quadriceps tendon rupture, left, initial encounter 01/27/2023    PAF (paroxysmal atrial fibrillation) ([facility] [address] 01/27/2023    SHELIA (obstructive sleep apnea) 01/27/2023    Essential hypertension 09/03/2015    Gout 02/01/2013    Palpitations 02/01/2013    ED (erectile dysfunction) 02/01/2013        PLAN:      Pre-Op Clearance: Knee Surgery. Currently stable from cardiovascular standpoint. History of A. fib ablation x2, currently maintaining sinus rhythm. No evidence of unstable coronary syndrome, heart failure or significant arrhythmia. No significant valvular abnormalities on clinical examination. Echo reviewed, ejection fraction is normal moderate LVH, mild diastolic dysfunction in addition to mild aortic and tricuspid regurgitation. Please restart Toprol-XL and losartan for better control of blood pressure. Continue holding Eliquis. Pre-Operative Risk assessment using 2014 ACC/AHA guidelines   Emergent procedure No  Active Cardiac Condition No (decompensated HF, Arrhythmia, MI <3 weeks, severe valve disease)  Risk Level of Procedure Intermediate Risk (intraperitoneal, intrathoracic, HENT, orthopedic, or carotid endarterectomy, etc.)  Revised Cardiac Risk Index Risk factors:  History of atrial fibrillation   Measurement of Exercise Tolerance before Surgery >4 Yes  According to the 2014 ACC/AHA pre-operative risk assessment guidelines Gene Roa is at low to intermediate risk for major cardiac complications during a intermediate risk procedure and may continue as planned. Specific medication recommendations are listed below. Medications recommended to continue should be taken with a sip of water even when NPO.    Medical management to reduce perioperative risk:  Anticoagulant Agent: I would suggest holding his Apixaban (Eliquis) 2-3 days prior to the procedure. Anticoagulation Bridging: I do not think that heparin/lovenox bridging would be necessary. Additional Recommendations: I would also suggest that he continue his beta blocker throughout the perioperative period. History of  Atrial Fibrillation: Rate Control S/p Ablation x2. Currently maintaining sinus rhythm. Beta Blocker: Continue metoprolol succinate (Toprol XL) 50 mg once daily. [id number] Score for Atrial Fibrillation Stroke Risk   Risk   Factors  Component Value   C CHF No 0   H HTN Yes 1   A2 Age >= 76 Yes,  (78 y.o.) 2   D DM No 0   S2 Prior Stroke/TIA No 0   V Vascular Disease No 0   A Age 74-69 No,  (78 y.o.) 0   Sc Sex male 0    [id number]  Score  3   Score last updated 3/35/40 7:56 PM EST  Click here for a link to the UpToDate guideline \"Atrial Fibrillation: Anticoagulation therapy to prevent embolization  Disclaimer: Risk Score calculation is dependent on accuracy of patient problem list and past encounter diagnosis. Stroke Risk: His CHADS2-VASc score is 3/9 (3.2% stroke risk)  Anticoagulation: Continue Apixaban (Eliquis) 5 mg every 12 hours. I also reminded him to watch for signs of blood in his stool or black tarry stools and stop the medication immediately if this develops as this could be life threatening. Essential Hypertension: Uncontrolled  Beta Blocker: Continue Metoprolol succinate (Toprol XL) 50 mg daily. ACE Inibitor/ARB:  Continue losartan 25 mg daily. Continue monitor blood pressure as per current protocol. Will consider starting low-dose amlodipine if blood pressure remains uncontrolled. Once again, thank you for allowing me to participate in this patients care. Please do not hesitate to contact me if I could be of any further assistance. Sincerely,  Jessica Street MD, F.A.C.C.   [address] [facility]     [address]  [address] [address]  Phone: [phone], Fax: [phone]     I believe that the risk of significant morbidity and mortality related to the patient's current medical conditions are: Intermediate. The documentation recorded by the scribe, accurately and completely reflects the services I personally performed and the decisions made by me. Luis Sherman MD, F.A.C.C.  January 27, 2023

## 2023-01-28 ENCOUNTER — ANESTHESIA (OUTPATIENT)
Dept: OPERATING ROOM | Age: 78
End: 2023-01-28
Payer: COMMERCIAL

## 2023-01-28 LAB
ABSOLUTE EOS #: 0.09 K/UL (ref 0–0.44)
ABSOLUTE IMMATURE GRANULOCYTE: 0.03 K/UL (ref 0–0.3)
ABSOLUTE LYMPH #: 1.8 K/UL (ref 1.1–3.7)
ABSOLUTE MONO #: 0.9 K/UL (ref 0.1–1.2)
ALBUMIN SERPL-MCNC: 3.6 G/DL (ref 3.5–5.2)
ALBUMIN/GLOBULIN RATIO: 1.4 (ref 1–2.5)
ALP BLD-CCNC: 66 U/L (ref 40–129)
ALT SERPL-CCNC: 21 U/L (ref 5–41)
ANION GAP SERPL CALCULATED.3IONS-SCNC: 9 MMOL/L (ref 9–17)
AST SERPL-CCNC: 16 U/L
BASOPHILS # BLD: 1 % (ref 0–2)
BASOPHILS ABSOLUTE: 0.04 K/UL (ref 0–0.2)
BILIRUB SERPL-MCNC: 0.7 MG/DL (ref 0.3–1.2)
BUN BLDV-MCNC: 14 MG/DL (ref 8–23)
BUN/CREAT BLD: 16 (ref 9–20)
CALCIUM SERPL-MCNC: 8.7 MG/DL (ref 8.6–10.4)
CHLORIDE BLD-SCNC: 104 MMOL/L (ref 98–107)
CO2: 24 MMOL/L (ref 20–31)
CREAT SERPL-MCNC: 0.86 MG/DL (ref 0.7–1.2)
EOSINOPHILS RELATIVE PERCENT: 1 % (ref 1–4)
GFR SERPL CREATININE-BSD FRML MDRD: >60 ML/MIN/1.73M2
GLUCOSE BLD-MCNC: 114 MG/DL (ref 70–99)
HCT VFR BLD CALC: 40.3 % (ref 40.7–50.3)
HEMOGLOBIN: 13.9 G/DL (ref 13–17)
IMMATURE GRANULOCYTES: 0 %
LYMPHOCYTES # BLD: 23 % (ref 24–43)
MCH RBC QN AUTO: 31.5 PG (ref 25.2–33.5)
MCHC RBC AUTO-ENTMCNC: 34.5 G/DL (ref 28.4–34.8)
MCV RBC AUTO: 91.4 FL (ref 82.6–102.9)
MONOCYTES # BLD: 12 % (ref 3–12)
NRBC AUTOMATED: 0 PER 100 WBC
PDW BLD-RTO: 12.5 % (ref 11.8–14.4)
PLATELET # BLD: 163 K/UL (ref 138–453)
PMV BLD AUTO: 9.9 FL (ref 8.1–13.5)
POTASSIUM SERPL-SCNC: 4 MMOL/L (ref 3.7–5.3)
RBC # BLD: 4.41 M/UL (ref 4.21–5.77)
SEG NEUTROPHILS: 63 % (ref 36–65)
SEGMENTED NEUTROPHILS ABSOLUTE COUNT: 4.91 K/UL (ref 1.5–8.1)
SODIUM BLD-SCNC: 137 MMOL/L (ref 135–144)
TOTAL PROTEIN: 6.1 G/DL (ref 6.4–8.3)
WBC # BLD: 7.8 K/UL (ref 3.5–11.3)

## 2023-01-28 PROCEDURE — 7100000000 HC PACU RECOVERY - FIRST 15 MIN: Performed by: ORTHOPAEDIC SURGERY

## 2023-01-28 PROCEDURE — 6360000002 HC RX W HCPCS: Performed by: ORTHOPAEDIC SURGERY

## 2023-01-28 PROCEDURE — 85025 COMPLETE CBC W/AUTO DIFF WBC: CPT

## 2023-01-28 PROCEDURE — 6370000000 HC RX 637 (ALT 250 FOR IP)

## 2023-01-28 PROCEDURE — 6370000000 HC RX 637 (ALT 250 FOR IP): Performed by: ORTHOPAEDIC SURGERY

## 2023-01-28 PROCEDURE — 6370000000 HC RX 637 (ALT 250 FOR IP): Performed by: STUDENT IN AN ORGANIZED HEALTH CARE EDUCATION/TRAINING PROGRAM

## 2023-01-28 PROCEDURE — 80053 COMPREHEN METABOLIC PANEL: CPT

## 2023-01-28 PROCEDURE — 2580000003 HC RX 258: Performed by: ORTHOPAEDIC SURGERY

## 2023-01-28 PROCEDURE — 6360000002 HC RX W HCPCS

## 2023-01-28 PROCEDURE — 6360000002 HC RX W HCPCS: Performed by: NURSE PRACTITIONER

## 2023-01-28 PROCEDURE — 76942 ECHO GUIDE FOR BIOPSY: CPT

## 2023-01-28 PROCEDURE — 3700000001 HC ADD 15 MINUTES (ANESTHESIA): Performed by: ORTHOPAEDIC SURGERY

## 2023-01-28 PROCEDURE — 0LQM0ZZ REPAIR LEFT UPPER LEG TENDON, OPEN APPROACH: ICD-10-PCS | Performed by: ORTHOPAEDIC SURGERY

## 2023-01-28 PROCEDURE — 6370000000 HC RX 637 (ALT 250 FOR IP): Performed by: NURSE PRACTITIONER

## 2023-01-28 PROCEDURE — 36415 COLL VENOUS BLD VENIPUNCTURE: CPT

## 2023-01-28 PROCEDURE — 2500000003 HC RX 250 WO HCPCS: Performed by: ORTHOPAEDIC SURGERY

## 2023-01-28 PROCEDURE — 2580000003 HC RX 258: Performed by: STUDENT IN AN ORGANIZED HEALTH CARE EDUCATION/TRAINING PROGRAM

## 2023-01-28 PROCEDURE — C1713 ANCHOR/SCREW BN/BN,TIS/BN: HCPCS | Performed by: ORTHOPAEDIC SURGERY

## 2023-01-28 PROCEDURE — 2709999900 HC NON-CHARGEABLE SUPPLY: Performed by: ORTHOPAEDIC SURGERY

## 2023-01-28 PROCEDURE — 3600000003 HC SURGERY LEVEL 3 BASE: Performed by: ORTHOPAEDIC SURGERY

## 2023-01-28 PROCEDURE — 2500000003 HC RX 250 WO HCPCS

## 2023-01-28 PROCEDURE — 1200000000 HC SEMI PRIVATE

## 2023-01-28 PROCEDURE — 7100000001 HC PACU RECOVERY - ADDTL 15 MIN: Performed by: ORTHOPAEDIC SURGERY

## 2023-01-28 PROCEDURE — 3700000000 HC ANESTHESIA ATTENDED CARE: Performed by: ORTHOPAEDIC SURGERY

## 2023-01-28 PROCEDURE — 94761 N-INVAS EAR/PLS OXIMETRY MLT: CPT

## 2023-01-28 PROCEDURE — 2580000003 HC RX 258

## 2023-01-28 PROCEDURE — 3600000013 HC SURGERY LEVEL 3 ADDTL 15MIN: Performed by: ORTHOPAEDIC SURGERY

## 2023-01-28 DEVICE — SYSTEM FIX LIGMNT AUG REP JUMPSTART DRSG SWIVELOCK: Type: IMPLANTABLE DEVICE | Site: KNEE | Status: FUNCTIONAL

## 2023-01-28 RX ORDER — ROPIVACAINE HYDROCHLORIDE 5 MG/ML
INJECTION, SOLUTION EPIDURAL; INFILTRATION; PERINEURAL PRN
Status: DISCONTINUED | OUTPATIENT
Start: 2023-01-28 | End: 2023-01-28 | Stop reason: SDUPTHER

## 2023-01-28 RX ORDER — LORAZEPAM 0.5 MG/1
0.5 TABLET ORAL EVERY 6 HOURS PRN
Status: DISCONTINUED | OUTPATIENT
Start: 2023-01-28 | End: 2023-01-29 | Stop reason: HOSPADM

## 2023-01-28 RX ORDER — OXYCODONE HYDROCHLORIDE 5 MG/1
5 TABLET ORAL EVERY 4 HOURS PRN
Status: DISCONTINUED | OUTPATIENT
Start: 2023-01-28 | End: 2023-01-29 | Stop reason: HOSPADM

## 2023-01-28 RX ORDER — DIMENHYDRINATE 50 MG/1
50 TABLET ORAL ONCE
Status: COMPLETED | OUTPATIENT
Start: 2023-01-28 | End: 2023-01-28

## 2023-01-28 RX ORDER — HYDROCODONE BITARTRATE AND ACETAMINOPHEN 5; 325 MG/1; MG/1
1 TABLET ORAL EVERY 4 HOURS PRN
Status: CANCELLED | OUTPATIENT
Start: 2023-01-28

## 2023-01-28 RX ORDER — KETOROLAC TROMETHAMINE 30 MG/ML
INJECTION, SOLUTION INTRAMUSCULAR; INTRAVENOUS PRN
Status: DISCONTINUED | OUTPATIENT
Start: 2023-01-28 | End: 2023-01-28 | Stop reason: SDUPTHER

## 2023-01-28 RX ORDER — ONDANSETRON 2 MG/ML
4 INJECTION INTRAMUSCULAR; INTRAVENOUS
Status: CANCELLED | OUTPATIENT
Start: 2023-01-28 | End: 2023-01-29

## 2023-01-28 RX ORDER — PROPOFOL 10 MG/ML
INJECTION, EMULSION INTRAVENOUS PRN
Status: DISCONTINUED | OUTPATIENT
Start: 2023-01-28 | End: 2023-01-28 | Stop reason: SDUPTHER

## 2023-01-28 RX ORDER — CEFAZOLIN SODIUM IN 0.9 % NACL 2 G/100 ML
2000 PLASTIC BAG, INJECTION (ML) INTRAVENOUS EVERY 8 HOURS
Status: COMPLETED | OUTPATIENT
Start: 2023-01-28 | End: 2023-01-29

## 2023-01-28 RX ORDER — SODIUM CHLORIDE 0.9 % (FLUSH) 0.9 %
5-40 SYRINGE (ML) INJECTION EVERY 12 HOURS SCHEDULED
Status: CANCELLED | OUTPATIENT
Start: 2023-01-28

## 2023-01-28 RX ORDER — SODIUM CHLORIDE, SODIUM LACTATE, POTASSIUM CHLORIDE, CALCIUM CHLORIDE 600; 310; 30; 20 MG/100ML; MG/100ML; MG/100ML; MG/100ML
INJECTION, SOLUTION INTRAVENOUS CONTINUOUS PRN
Status: DISCONTINUED | OUTPATIENT
Start: 2023-01-28 | End: 2023-01-28 | Stop reason: SDUPTHER

## 2023-01-28 RX ORDER — LORAZEPAM 0.5 MG/1
0.5 TABLET ORAL EVERY 6 HOURS PRN
Status: DISCONTINUED | OUTPATIENT
Start: 2023-01-28 | End: 2023-01-28

## 2023-01-28 RX ORDER — METOCLOPRAMIDE HYDROCHLORIDE 5 MG/ML
10 INJECTION INTRAMUSCULAR; INTRAVENOUS
Status: CANCELLED | OUTPATIENT
Start: 2023-01-28 | End: 2023-01-29

## 2023-01-28 RX ORDER — FENTANYL CITRATE 50 UG/ML
50 INJECTION, SOLUTION INTRAMUSCULAR; INTRAVENOUS EVERY 5 MIN PRN
Status: CANCELLED | OUTPATIENT
Start: 2023-01-28

## 2023-01-28 RX ORDER — ONDANSETRON 2 MG/ML
INJECTION INTRAMUSCULAR; INTRAVENOUS PRN
Status: DISCONTINUED | OUTPATIENT
Start: 2023-01-28 | End: 2023-01-28 | Stop reason: SDUPTHER

## 2023-01-28 RX ORDER — SODIUM CHLORIDE 9 MG/ML
INJECTION, SOLUTION INTRAVENOUS PRN
Status: CANCELLED | OUTPATIENT
Start: 2023-01-28

## 2023-01-28 RX ORDER — SODIUM CHLORIDE 0.9 % (FLUSH) 0.9 %
5-40 SYRINGE (ML) INJECTION PRN
Status: CANCELLED | OUTPATIENT
Start: 2023-01-28

## 2023-01-28 RX ORDER — LABETALOL HYDROCHLORIDE 5 MG/ML
10 INJECTION, SOLUTION INTRAVENOUS
Status: CANCELLED | OUTPATIENT
Start: 2023-01-28

## 2023-01-28 RX ORDER — BUPIVACAINE HYDROCHLORIDE AND EPINEPHRINE 5; 5 MG/ML; UG/ML
INJECTION, SOLUTION EPIDURAL; INTRACAUDAL; PERINEURAL PRN
Status: DISCONTINUED | OUTPATIENT
Start: 2023-01-28 | End: 2023-01-28 | Stop reason: ALTCHOICE

## 2023-01-28 RX ORDER — HYDRALAZINE HYDROCHLORIDE 20 MG/ML
10 INJECTION INTRAMUSCULAR; INTRAVENOUS
Status: CANCELLED | OUTPATIENT
Start: 2023-01-28

## 2023-01-28 RX ORDER — ONDANSETRON 2 MG/ML
4 INJECTION INTRAMUSCULAR; INTRAVENOUS EVERY 6 HOURS PRN
Status: DISCONTINUED | OUTPATIENT
Start: 2023-01-28 | End: 2023-01-29 | Stop reason: HOSPADM

## 2023-01-28 RX ORDER — OXYCODONE HYDROCHLORIDE 5 MG/1
10 TABLET ORAL EVERY 4 HOURS PRN
Status: DISCONTINUED | OUTPATIENT
Start: 2023-01-28 | End: 2023-01-29 | Stop reason: HOSPADM

## 2023-01-28 RX ORDER — DEXAMETHASONE SODIUM PHOSPHATE 10 MG/ML
INJECTION, SOLUTION INTRAMUSCULAR; INTRAVENOUS PRN
Status: DISCONTINUED | OUTPATIENT
Start: 2023-01-28 | End: 2023-01-28 | Stop reason: SDUPTHER

## 2023-01-28 RX ORDER — ONDANSETRON 4 MG/1
4 TABLET, ORALLY DISINTEGRATING ORAL EVERY 8 HOURS PRN
Status: DISCONTINUED | OUTPATIENT
Start: 2023-01-28 | End: 2023-01-29 | Stop reason: HOSPADM

## 2023-01-28 RX ORDER — CEFAZOLIN SODIUM IN 0.9 % NACL 2 G/100 ML
2000 PLASTIC BAG, INJECTION (ML) INTRAVENOUS
Status: COMPLETED | OUTPATIENT
Start: 2023-01-28 | End: 2023-01-28

## 2023-01-28 RX ORDER — LIDOCAINE HYDROCHLORIDE 20 MG/ML
INJECTION, SOLUTION EPIDURAL; INFILTRATION; INTRACAUDAL; PERINEURAL PRN
Status: DISCONTINUED | OUTPATIENT
Start: 2023-01-28 | End: 2023-01-28 | Stop reason: SDUPTHER

## 2023-01-28 RX ORDER — HYDROCODONE BITARTRATE AND ACETAMINOPHEN 5; 325 MG/1; MG/1
2 TABLET ORAL EVERY 4 HOURS PRN
Status: CANCELLED | OUTPATIENT
Start: 2023-01-28

## 2023-01-28 RX ORDER — LORAZEPAM 2 MG/ML
0.5 INJECTION INTRAMUSCULAR ONCE
Status: DISCONTINUED | OUTPATIENT
Start: 2023-01-28 | End: 2023-01-29 | Stop reason: HOSPADM

## 2023-01-28 RX ORDER — FENTANYL CITRATE 50 UG/ML
INJECTION, SOLUTION INTRAMUSCULAR; INTRAVENOUS PRN
Status: DISCONTINUED | OUTPATIENT
Start: 2023-01-28 | End: 2023-01-28 | Stop reason: SDUPTHER

## 2023-01-28 RX ORDER — DEXAMETHASONE SODIUM PHOSPHATE 4 MG/ML
INJECTION, SOLUTION INTRA-ARTICULAR; INTRALESIONAL; INTRAMUSCULAR; INTRAVENOUS; SOFT TISSUE PRN
Status: DISCONTINUED | OUTPATIENT
Start: 2023-01-28 | End: 2023-01-28 | Stop reason: SDUPTHER

## 2023-01-28 RX ADMIN — LOSARTAN POTASSIUM 25 MG: 25 TABLET, FILM COATED ORAL at 13:23

## 2023-01-28 RX ADMIN — SODIUM CHLORIDE, POTASSIUM CHLORIDE, SODIUM LACTATE AND CALCIUM CHLORIDE: 600; 310; 30; 20 INJECTION, SOLUTION INTRAVENOUS at 09:44

## 2023-01-28 RX ADMIN — PROPOFOL 150 MCG/KG/MIN: 10 INJECTION, EMULSION INTRAVENOUS at 11:01

## 2023-01-28 RX ADMIN — DEXAMETHASONE SODIUM PHOSPHATE 10 MG: 10 INJECTION, SOLUTION INTRAMUSCULAR; INTRAVENOUS at 09:36

## 2023-01-28 RX ADMIN — SODIUM CHLORIDE: 9 INJECTION, SOLUTION INTRAVENOUS at 02:38

## 2023-01-28 RX ADMIN — BUSPIRONE HYDROCHLORIDE 15 MG: 5 TABLET ORAL at 08:27

## 2023-01-28 RX ADMIN — MORPHINE SULFATE 2 MG: 2 INJECTION, SOLUTION INTRAMUSCULAR; INTRAVENOUS at 12:52

## 2023-01-28 RX ADMIN — FAMOTIDINE 20 MG: 20 TABLET, FILM COATED ORAL at 20:38

## 2023-01-28 RX ADMIN — DIMENHYDRINATE 50 MG: 50 TABLET ORAL at 08:29

## 2023-01-28 RX ADMIN — ACETAMINOPHEN 650 MG: 325 TABLET ORAL at 19:48

## 2023-01-28 RX ADMIN — FENTANYL CITRATE 50 MCG: 50 INJECTION INTRAMUSCULAR; INTRAVENOUS at 09:32

## 2023-01-28 RX ADMIN — PROPOFOL 20 MG: 10 INJECTION, EMULSION INTRAVENOUS at 11:03

## 2023-01-28 RX ADMIN — LORAZEPAM 0.5 MG: 0.5 TABLET ORAL at 17:44

## 2023-01-28 RX ADMIN — METOPROLOL SUCCINATE 50 MG: 50 TABLET, EXTENDED RELEASE ORAL at 08:27

## 2023-01-28 RX ADMIN — FENTANYL CITRATE 25 MCG: 50 INJECTION INTRAMUSCULAR; INTRAVENOUS at 10:19

## 2023-01-28 RX ADMIN — SODIUM CHLORIDE: 9 INJECTION, SOLUTION INTRAVENOUS at 13:23

## 2023-01-28 RX ADMIN — FENTANYL CITRATE 25 MCG: 50 INJECTION INTRAMUSCULAR; INTRAVENOUS at 09:50

## 2023-01-28 RX ADMIN — PROPOFOL 200 MG: 10 INJECTION, EMULSION INTRAVENOUS at 09:50

## 2023-01-28 RX ADMIN — Medication 2000 MG: at 17:12

## 2023-01-28 RX ADMIN — ROPIVACAINE HYDROCHLORIDE 20 ML: 5 INJECTION EPIDURAL; INFILTRATION; PERINEURAL at 09:36

## 2023-01-28 RX ADMIN — KETOROLAC TROMETHAMINE 15 MG: 30 INJECTION, SOLUTION INTRAMUSCULAR; INTRAVENOUS at 10:59

## 2023-01-28 RX ADMIN — Medication 2000 MG: at 09:42

## 2023-01-28 RX ADMIN — ONDANSETRON 4 MG: 2 INJECTION INTRAMUSCULAR; INTRAVENOUS at 10:59

## 2023-01-28 RX ADMIN — ACETAMINOPHEN 650 MG: 325 TABLET ORAL at 08:27

## 2023-01-28 RX ADMIN — BUSPIRONE HYDROCHLORIDE 15 MG: 5 TABLET ORAL at 20:38

## 2023-01-28 RX ADMIN — DEXAMETHASONE SODIUM PHOSPHATE 8 MG: 4 INJECTION, SOLUTION INTRAMUSCULAR; INTRAVENOUS at 10:05

## 2023-01-28 RX ADMIN — MORPHINE SULFATE 2 MG: 2 INJECTION, SOLUTION INTRAMUSCULAR; INTRAVENOUS at 04:03

## 2023-01-28 RX ADMIN — LIDOCAINE HYDROCHLORIDE 100 MG: 20 INJECTION, SOLUTION EPIDURAL; INFILTRATION; INTRACAUDAL; PERINEURAL at 09:50

## 2023-01-28 ASSESSMENT — PAIN SCALES - GENERAL
PAINLEVEL_OUTOF10: 7
PAINLEVEL_OUTOF10: 4
PAINLEVEL_OUTOF10: 7
PAINLEVEL_OUTOF10: 0
PAINLEVEL_OUTOF10: 5
PAINLEVEL_OUTOF10: 0
PAINLEVEL_OUTOF10: 5
PAINLEVEL_OUTOF10: 7
PAINLEVEL_OUTOF10: 4
PAINLEVEL_OUTOF10: 0
PAINLEVEL_OUTOF10: 0
PAINLEVEL_OUTOF10: 3

## 2023-01-28 ASSESSMENT — PAIN DESCRIPTION - DESCRIPTORS
DESCRIPTORS: DULL
DESCRIPTORS: DULL
DESCRIPTORS: ACHING
DESCRIPTORS: DULL
DESCRIPTORS: DISCOMFORT
DESCRIPTORS: DULL
DESCRIPTORS: DISCOMFORT
DESCRIPTORS: ACHING

## 2023-01-28 ASSESSMENT — PAIN DESCRIPTION - ORIENTATION
ORIENTATION: LEFT

## 2023-01-28 ASSESSMENT — PAIN DESCRIPTION - FREQUENCY
FREQUENCY: CONTINUOUS
FREQUENCY: INTERMITTENT
FREQUENCY: CONTINUOUS

## 2023-01-28 ASSESSMENT — PAIN DESCRIPTION - ONSET
ONSET: ON-GOING

## 2023-01-28 ASSESSMENT — PAIN DESCRIPTION - LOCATION
LOCATION: KNEE

## 2023-01-28 ASSESSMENT — PAIN DESCRIPTION - PAIN TYPE
TYPE: SURGICAL PAIN
TYPE: ACUTE PAIN
TYPE: SURGICAL PAIN
TYPE: ACUTE PAIN

## 2023-01-28 ASSESSMENT — PAIN - FUNCTIONAL ASSESSMENT
PAIN_FUNCTIONAL_ASSESSMENT: PREVENTS OR INTERFERES SOME ACTIVE ACTIVITIES AND ADLS
PAIN_FUNCTIONAL_ASSESSMENT: ACTIVITIES ARE NOT PREVENTED
PAIN_FUNCTIONAL_ASSESSMENT: PREVENTS OR INTERFERES SOME ACTIVE ACTIVITIES AND ADLS

## 2023-01-28 NOTE — PROGRESS NOTES
Surgery vitals checked at this time, patient is complaining of 7 out of 10 pain in his knee. PRN morphine given per patients request. Will reassess pain shortly.

## 2023-01-28 NOTE — PROGRESS NOTES
[facility]  Inpatient/Observation/Outpatient Rehabilitation    Date: 2023  Patient Name: Allegra Ramirez       [] Inpatient Acute/Observation       []  Outpatient  : 1945       [] Pt no showed for scheduled appointment    [] Pt refused/declined therapy at this time due to:           [x] Pt cancelled due to:  [] No Reason Given   [] Sick/ill   [x] Other: Pt in surgery for quad repair this morning. OT to evaluate following surgery. Therapist/Assistant will attempt to see this patient, at our earliest opportunity.        Emily Vaca, OT Date: 2023

## 2023-01-28 NOTE — PROGRESS NOTES
Patient was informed that his surgery was rescheduled for around 0930. Writer offered to call family this morning to update them but patient said he would text them.

## 2023-01-28 NOTE — ANESTHESIA PRE PROCEDURE
Department of Anesthesiology  Preprocedure Note       Name:  Xin Zimmerman   Age:  69 y.o.  :  1945                                          MRN:  [id number]         Date:  2023      Surgeon: Dominique Dee):  Unruly Agee DO    Procedure: Procedure(s):  QUADRICEPS REPAIR    Medications prior to admission:   Prior to Admission medications    Medication Sig Start Date End Date Taking? Authorizing Provider   metoprolol succinate (TOPROL XL) 50 MG extended release tablet TAKE 1 TABLET DAILY 23  Yes Historical Provider, MD   losartan (COZAAR) 25 MG tablet Take 25 mg by mouth daily 23  Yes Historical Provider, MD   busPIRone (BUSPAR) 15 MG tablet Take 15 mg by mouth 2 times daily 22  Yes Historical Provider, MD   Multiple Vitamins-Minerals (THERAPEUTIC MULTIVITAMIN-MINERALS) tablet Take 1 tablet by mouth daily    Historical Provider, MD   predniSONE (DELTASONE) 10 MG tablet Take 1 tablet by mouth 3 times daily as needed (Gout) 10/11/16   Sanaz Espitia MD   tadalafil (CIALIS) 20 MG tablet Take 1 tablet by mouth as needed for Erectile Dysfunction 16   Sanaz Espitia MD   ULORIC 40 MG TABS tablet TAKE 1 TABLET DAILY 16   Sanaz Espitia MD   ELIQUIS 5 MG TABS tablet Take 5 mg by mouth 2 times daily.  14   Historical Provider, MD       Current medications:    Current Facility-Administered Medications   Medication Dose Route Frequency Provider Last Rate Last Admin    0.9 % sodium chloride infusion   IntraVENous Continuous Tyler Roque MD 75 mL/hr at 23 0238 New Bag at 23 0238    sodium chloride flush 0.9 % injection 10 mL  10 mL IntraVENous 2 times per day Tyler Roque MD        sodium chloride flush 0.9 % injection 10 mL  10 mL IntraVENous PRN Tyler Roque MD        0.9 % sodium chloride infusion   IntraVENous PRN Tyler Roque MD        ondansetron (ZOFRAN-ODT) disintegrating tablet 4 mg  4 mg Oral Q8H PRN Tyler Roque MD        Or    ondansetron [facility]) injection 4 mg  4 mg IntraVENous Q6H PRN Manuel Major MD        polyethylene glycol (GLYCOLAX) packet 17 g  17 g Oral Daily PRN Manuel Major MD        famotidine (PEPCID) tablet 20 mg  20 mg Oral BID Manuel Major MD   20 mg at 01/27/23 2046    acetaminophen (TYLENOL) tablet 650 mg  650 mg Oral Q6H PRN Manuel Major MD        Or   [facility] acetaminophen (TYLENOL) suppository 650 mg  650 mg Rectal Q6H PRN Manuel Major MD        potassium chloride (KLOR-CON M) extended release tablet 40 mEq  40 mEq Oral PRN Manuel Major MD        Or    potassium bicarb-citric acid (EFFER-K) effervescent tablet 40 mEq  40 mEq Oral PRN Manuel Major MD        Or    potassium chloride 10 mEq/100 mL IVPB (Peripheral Line)  10 mEq IntraVENous PRN Manuel Major MD        magnesium sulfate 2000 mg in 50 mL IVPB premix  2,000 mg IntraVENous PRN Manuel Major MD        busPIRone (BUSPAR) tablet 15 mg  15 mg Oral BID Bronson Sill, APRN - CNP   15 mg at 01/27/23 2046    [Held by provider] apixaban (ELIQUIS) tablet 5 mg  5 mg Oral BID Bronson Sill, APRN - CNP        losartan (COZAAR) tablet 25 mg  25 mg Oral Daily Bronson Sill, APRN - CNP        metoprolol succinate (TOPROL XL) extended release tablet 50 mg  50 mg Oral Daily Bronson Sill, APRN - CNP        therapeutic multivitamin-minerals 1 tablet  1 tablet Oral Daily Bronson Sill, APRN - CNP        febuxostat (ULORIC) tablet 40 mg (PATIENT SUPPLIED) (Patient Supplied)  40 mg Oral Daily Bronson Sill, APRN - CNP        HYDROcodone-acetaminophen (NORCO) 5-325 MG per tablet 1 tablet  1 tablet Oral Q4H PRN Bronson Sill, APRN - CNP        Or    HYDROcodone-acetaminophen (NORCO) 5-325 MG per tablet 2 tablet  2 tablet Oral Q4H PRN Bronson Sill, APRN - CNP   2 tablet at 01/27/23 2207    morphine (PF) injection 2 mg  2 mg IntraVENous Q4H PRN Bronson Sill, APRN - CNP   2 mg at 01/28/23 0403       Allergies: Allergies   Allergen Reactions    Diltiazem Hcl Other (See Comments)     \"it stopped my heart\"         Problem List:    Patient Active Problem List   Diagnosis Code    Gout M10.9    Palpitations R00.2    ED (erectile dysfunction) N52.9    Uncontrolled hypertension I10    Quadriceps tendon rupture, left, initial encounter H76.090D    PAF [facility]oxysmal [facility] fibrillation) ([facility]) I48.0    SHELIA (obstructive sleep apnea) G47.33    Preop cardiovascular exam Z01.810    History of atrial fibrillation Z86.79    S/P ablation of atrial fibrillation Z98.890, Z86.79    Chronic anticoagulation Z79.01    Left ventricular hypertrophy I51.7       Past Medical History:        Diagnosis Date    Atrial fibrillation ([facility] [address]     Erectile dysfunction     Gout     Hypertension     Obesity     SHELIA (obstructive sleep apnea)     PAF (paroxysmal atrial fibrillation) ([facility] [address]     Unspecified sleep apnea        Past Surgical History:        Procedure Laterality Date    CARDIAC ELECTROPHYSIOLOG[facility]Y [facility]BL[facility]  03/[facility]    COLONOSCOPY      EYE SURGERY Left 01/01/2010    detached retina       Social History:    Social History     Tobacco Use    Smoking status: Never    Smokeless tobacco: Never   Substance Use Topics    Alcohol use:  Yes     Alcohol/week: 7.0 standard drinks     Types: 7 Standard drinks or equivalent per week                                Counseling given: Not Answered      Vital Signs (Current):   Vitals:    01/27/23 1915 01/28/23 0230 01/28/23 0545 01/28/23 0649   BP: (!) 162/93 134/80  (!) 165/87   Pulse: 81 80  77   Resp: 16 15  16   Temp: 36.4 °C (97.6 °F) 36.6 °C (97.8 °F)  36.4 °C (97.5 °F)   TempSrc: Temporal Temporal  Temporal   SpO2: 95% 96%  95%   Weight:   240 lb (108.9 kg)    Height:                                                  BP Readings from Last 3 Encounters:   01/28/23 (!) 165/87   06/30/19 137/73   10/25/16 138/74       NPO Status: BMI:   Wt Readings from Last 3 Encounters:   01/28/23 240 lb (108.9 kg)   06/30/19 225 lb (102.1 kg)   10/25/16 231 lb (104.8 kg)     Body mass index is 32.55 kg/m². CBC:   Lab Results   Component Value Date/Time    WBC 7.8 01/28/2023 05:20 AM    RBC 4.41 01/28/2023 05:20 AM    HGB 13.9 01/28/2023 05:20 AM    HCT 40.3 01/28/2023 05:20 AM    MCV 91.4 01/28/2023 05:20 AM    RDW 12.5 01/28/2023 05:20 AM     01/28/2023 05:20 AM       CMP:   Lab Results   Component Value Date/Time     01/28/2023 05:20 AM    K 4.0 01/28/2023 05:20 AM     01/28/2023 05:20 AM    CO2 24 01/28/2023 05:20 AM    BUN 14 01/28/2023 05:20 AM    CREATININE 0.86 01/28/2023 05:20 AM    GFRAA >60 09/08/2022 01:50 PM    LABGLOM >60 01/28/2023 05:20 AM    GLUCOSE 114 01/28/2023 05:20 AM    PROT 6.1 01/28/2023 05:20 AM    CALCIUM 8.7 01/28/2023 05:20 AM    BILITOT 0.7 01/28/2023 05:20 AM    ALKPHOS 66 01/28/2023 05:20 AM    AST 16 01/28/2023 05:20 AM    ALT 21 01/28/2023 05:20 AM       POC Tests: No results for input(s): POCGLU, POCNA, POCK, POCCL, POCBUN, POCHEMO, POCHCT in the last 72 hours.     Coags:   Lab Results   Component Value Date/Time    PROTIME 14.8 01/27/2023 10:03 AM    INR 1.2 01/27/2023 10:03 AM       HCG (If Applicable): No results found for: PREGTESTUR, PREGSERUM, HCG, HCGQUANT     ABGs: No results found for: PHART, PO2ART, LBB4ESW, KOW9ONE, BEART, [id number]     Type & Screen (If Applicable):  No results found for: LABABO, LABRH    Drug/Infectious Status (If Applicable):  No results found for: HIV, HEPCAB    COVID-19 Screening (If Applicable): No results found for: COVID19        Anesthesia Evaluation  Patient summary reviewed and Nursing notes reviewed no history of anesthetic complications:   Airway: Mallampati: III  TM distance: >3 FB   Neck ROM: full  Mouth opening: > = 3 FB   Dental: normal exam         Pulmonary:normal exam  breath sounds clear to auscultation  (+) sleep apnea: on CPAP,                             Cardiovascular:  Exercise tolerance: good (>4 METS),   (+) hypertension: moderate, dysrhythmias (s/p ablation): atrial fibrillation,       ECG reviewed  Rhythm: regular  Rate: normal  Echocardiogram reviewed         Beta Blocker:  Dose within 24 Hrs         Neuro/Psych:   Negative Neuro/Psych ROS              GI/Hepatic/Renal: Neg GI/Hepatic/Renal ROS            Endo/Other:    (+) blood dyscrasia: anticoagulation therapy:., .                 Abdominal:   (+) obese,           Vascular: negative vascular ROS. Other Findings:           Anesthesia Plan      general and regional     ASA 2           MIPS: Postoperative opioids intended and Prophylactic antiemetics administered. Anesthetic plan and risks discussed with patient. Use of blood products discussed with patient whom consented to blood products.            Post-op pain plan if not by surgeon: single peripheral nerve block            ZAIRA Narayan - KATE   1/28/2023

## 2023-01-28 NOTE — PROGRESS NOTES
Writer spoke with anesthesia at this time regarding morning medications, per anesthesiologist, give PRN tylenol 650 mg, buspar, metoprolol, and order was also received for dose of dramamine. Will give medications shortly.

## 2023-01-28 NOTE — PROGRESS NOTES
HISTORY OF PRESENT ILLNESS  Bee Maddox is a 63 y.o. female. HPI ESTABLISHED patient here for LEFT breast pain and redness at lumpectomy site. Started 1 week ago. Saw PCP who prescribed her clindamycin which she is still taking. Reports a hardness to breast.     Bilateral diagnostic mammogram 10/3/16: BI-RADS 2.     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 1/5/16- LEFT breast lumpectomy, SLNB - T2N0  Mammaprint low risk luminal A. No chemotherapy. 4/5/16- completed radiation. Followed by Dr. Samm Montague. 5/3/16- started Arimidex. Followed by Dr. Jenelle Garvin. FH includes-  Mother diagnosed with pancreatic cancer at age 47 and diagnosed with breast cancer at age 54. Maternal grandmother diagnosed with breast cancer in her 62s. Patient has had genetic testing- VUS of Syntaxin    Review of Systems   All other systems reviewed and are negative. Physical Exam   Pulmonary/Chest: Left breast exhibits skin change (erythema around lumpectomy scar). Nursing note and vitals reviewed. ASPIRATION OF SEROMA  Indication : Seroma:  left  Lumpectomy site  Prep : Alcohol. Guidance : Ultrasound guidance. Yield :  6 cc of purulent fluid was aspirated with an 18 gauge needle. Sent for culture. Effect : Seroma completely aspirated. Continue clindamycin  Disposition:  Follow up Monday  ASSESSMENT and PLAN    ICD-10-CM ICD-9-CM    1. Seroma of breast N64.89 998.13 CULTURE, ANAEROBIC AND AEROBIC   2. Breast cancer of upper-outer quadrant of left female breast (HCC) C50.412 174.4    3. S/P lumpectomy, left breast Z90.12 V45.89      - infected seroma cavity. S/p aspiration. Continue clindamycin. Fluid sent for culture. - f/u on Monday. Pt called out about being anxious and feeling claustrophobic. Writer did offer PRN pain medication at this time, Pt denies wanting any pain meds. Informed pt that writer will reach out to  about possibly getting something to help with anxiety.

## 2023-01-28 NOTE — PROGRESS NOTES
[address], [address]    Progress Note    Date:   1/28/2023  Patient name:  Lord Atkinson  Date of admission:  1/27/2023  8:43 AM  MRN:   [id number]  YOB: 1945    SUBJECTIVE/Last 24 hours update:     Patient seen and examined at the bed side , no new acute events overnight and no new complains. He continues to have some discomfort of the left knee, rated 5/10. Plan for OR today. Notes from nursing staff and Consults had been reviewed, and the overnight progress had been checked with the nursing staff as well. REVIEW OF SYSTEMS:      CONSTITUTIONAL:  no fevers, no headcahes  EYES: negative for blury vision  HEENT: No headaches, No nasal congestion, no difficulty swallowing  RESPIRATORY:negative for dyspnea, no wheezing, no Cough  CARDIOVASCULAR: negative for chest pain, no palpitations  GASTROINTESTINAL: no nausea, no vomiting, no change in bowel habits, no abdominal pain   GENITOURINARY: negative for dysuria, no hematuria   MUSCULOSKELETAL: no joint pains, no muscle aches, no swelling of joints or extremities, left knee pain  NEUROLOGICAL: No  Weakness or numbness      PAST MEDICAL HISTORY:      has a past medical history of Atrial fibrillation ([address] Erectile dysfunction, Gout, Hypertension, Obesity, SHELIA (obstructive sleep apnea), PAF (paroxysmal atrial fibrillation) ([address] and Unspecified sleep apnea. PAST SURGICAL HISTORY:      has a past surgical history that includes Colonoscopy; eye surgery (Left, 01/01/2010); and Cardiac electrophysiology study and ablation (03/2020). SOCIAL HISTORY:      reports that he has never smoked. He has never used smokeless tobacco. He reports current alcohol use of about 7.0 standard drinks per week. He reports that he does not use drugs. TOBACCO:   reports that he has never smoked. He has never used smokeless tobacco.  ETOH:   reports current alcohol use of about 7.0 standard drinks per week.   Reviewed and non-contributory or as noted above and/or in the HPI    FAMILY HISTORY:     family history includes Arthritis in his mother, sister, and sister; Diabetes in his father; High Blood Pressure in his father and mother.      Problem Relation Age of Onset    Arthritis Mother     High Blood Pressure Mother     Diabetes Father     High Blood Pressure Father     Arthritis Sister     Arthritis Sister      HOME MEDICATIONS:      Prior to Admission medications    Medication Sig Start Date End Date Taking? Authorizing Provider   metoprolol succinate (TOPROL XL) 50 MG extended release tablet TAKE 1 TABLET DAILY 1/9/23  Yes Historical Provider, MD   losartan (COZAAR) 25 MG tablet Take 25 mg by mouth daily 1/8/23  Yes Historical Provider, MD   busPIRone (BUSPAR) 15 MG tablet Take 15 mg by mouth 2 times daily 11/12/22  Yes Historical Provider, MD   Multiple Vitamins-Minerals (THERAPEUTIC MULTIVITAMIN-MINERALS) tablet Take 1 tablet by mouth daily    Historical Provider, MD   predniSONE (DELTASONE) 10 MG tablet Take 1 tablet by mouth 3 times daily as needed (Gout) 10/11/16   Santo King MD   tadalafil (CIALIS) 20 MG tablet Take 1 tablet by mouth as needed for Erectile Dysfunction 9/1/16   Santo King MD   ULORIC 40 MG TABS tablet TAKE 1 TABLET DAILY 5/13/16   Santo King MD   ELIQUIS 5 MG TABS tablet Take 5 mg by mouth 2 times daily. 8/25/14   Historical Provider, MD       ALLERGIES:     Diltiazem hcl      OBJECTIVE:       Vitals:    01/27/23 1915 01/28/23 0230 01/28/23 0545 01/28/23 0649   BP: (!) 162/93 134/80  (!) 165/87   Pulse: 81 80  77   Resp: 16 15  16   Temp: 97.6 °F (36.4 °C) 97.8 °F (36.6 °C)  97.5 °F (36.4 °C)   TempSrc: Temporal Temporal  Temporal   SpO2: 95% 96%  95%   Weight:   240 lb (108.9 kg)    Height:             Intake/Output Summary (Last 24 hours) at 1/28/2023 0847  Last data filed at 1/28/2023 0836  Gross per 24 hour   Intake 2193.51 ml   Output 1300 ml   Net 893.51 ml       PHYSICAL EXAM:  General Appearance   Alert , awake , not in acute distress  HEENT - Head is normocephalic, atraumatic. Lungs - Bilateral equal air entry , no wheezes, rales or rhonchi, aeration good  Cardiovascular - Heart sounds are normal.  Regular rhythm, normal rate without murmur, gallop or rub. Abdomen - Soft, nontender, nondistended, no masses or organomegaly  Neurologic - There are no new focal motor or sensory deficits  Skin - No bruising or bleeding on exposed skin area  Extremities - No cyanosis, clubbing or edema, brace is c/di, minimal tenderness noted with palpation ,NVI distally.       DIAGNOSTICS:     Laboratory Testing:    See [facility] EMR for lab data    Recent Results (from the past 24 hour(s))   EKG 12 Lead    Collection Time: 01/27/23  9:48 AM   Result Value Ref Range    Ventricular Rate 71 BPM    Atrial Rate 71 BPM    P-R Interval 166 ms    QRS Duration 90 ms    Q-T Interval 392 ms    QTc Calculation (Bazett) 425 ms    P Axis 34 degrees    R Axis 20 degrees    T Axis 34 degrees   CBC    Collection Time: 01/27/23 10:03 AM   Result Value Ref Range    WBC 7.0 3.5 - 11.3 k/uL    RBC 4.98 4.21 - 5.77 m/uL    Hemoglobin 15.8 13.0 - 17.0 g/dL    Hematocrit 45.6 40.7 - 50.3 %    MCV 91.6 82.6 - 102.9 fL    MCH 31.7 25.2 - 33.5 pg    MCHC 34.6 28.4 - 34.8 g/dL    RDW 12.5 11.8 - 14.4 %    Platelets 106 269 - 174 k/uL    MPV 9.6 8.1 - 13.5 fL    NRBC Automated 0.0 0.0 per 100 WBC   Basic Metabolic Panel    Collection Time: 01/27/23 10:03 AM   Result Value Ref Range    Glucose 132 (H) 70 - 99 mg/dL    BUN 18 8 - 23 mg/dL    Creatinine 0.97 0.70 - 1.20 mg/dL    Est, Glom Filt Rate >60 >60 mL/min/1.73m2    Bun/Cre Ratio 19 9 - 20    Calcium 9.6 8.6 - 10.4 mg/dL    Sodium 138 135 - 144 mmol/L    Potassium 4.3 3.7 - 5.3 mmol/L    Chloride 103 98 - 107 mmol/L    CO2 25 20 - 31 mmol/L    Anion Gap 10 9 - 17 mmol/L   Protime-INR    Collection Time: 01/27/23 10:03 AM   Result Value Ref Range    Protime 14.8 (H) 11.5 - 14.2 sec    INR 1.2    CBC auto differential    Collection Time: 01/28/23  5:20 AM   Result Value Ref Range    WBC 7.8 3.5 - 11.3 k/uL    RBC 4.41 4.21 - 5.77 m/uL    Hemoglobin 13.9 13.0 - 17.0 g/dL    Hematocrit 40.3 (L) 40.7 - 50.3 %    MCV 91.4 82.6 - 102.9 fL    MCH 31.5 25.2 - 33.5 pg    MCHC 34.5 28.4 - 34.8 g/dL    RDW 12.5 11.8 - 14.4 %    Platelets 452 451 - 201 k/uL    MPV 9.9 8.1 - 13.5 fL    NRBC Automated 0.0 0.0 per 100 WBC    Seg Neutrophils 63 36 - 65 %    Lymphocytes 23 (L) 24 - 43 %    Monocytes 12 3 - 12 %    Eosinophils % 1 1 - 4 %    Basophils 1 0 - 2 %    Immature Granulocytes 0 0 %    Segs Absolute 4.91 1.50 - 8.10 k/uL    Absolute Lymph # 1.80 1.10 - 3.70 k/uL    Absolute Mono # 0.90 0.10 - 1.20 k/uL    Absolute Eos # 0.09 0.00 - 0.44 k/uL    Basophils Absolute 0.04 0.00 - 0.20 k/uL    Absolute Immature Granulocyte 0.03 0.00 - 0.30 k/uL   Comprehensive Metabolic Panel w/ Reflex to MG    Collection Time: 01/28/23  5:20 AM   Result Value Ref Range    Glucose 114 (H) 70 - 99 mg/dL    BUN 14 8 - 23 mg/dL    Creatinine 0.86 0.70 - 1.20 mg/dL    Est, Glom Filt Rate >60 >60 mL/min/1.73m2    Bun/Cre Ratio 16 9 - 20    Calcium 8.7 8.6 - 10.4 mg/dL    Sodium 137 135 - 144 mmol/L    Potassium 4.0 3.7 - 5.3 mmol/L    Chloride 104 98 - 107 mmol/L    CO2 24 20 - 31 mmol/L    Anion Gap 9 9 - 17 mmol/L    Alkaline Phosphatase 66 40 - 129 U/L    ALT 21 5 - 41 U/L    AST 16 <40 U/L    Total Bilirubin 0.7 0.3 - 1.2 mg/dL    Total Protein 6.1 (L) 6.4 - 8.3 g/dL    Albumin 3.6 3.5 - 5.2 g/dL    Albumin/Globulin Ratio 1.4 1.0 - 2.5       Current Facility-Administered Medications   Medication Dose Route Frequency Provider Last Rate Last Admin    0.9 % sodium chloride infusion   IntraVENous Continuous Francheska Whitten MD 75 mL/hr at 01/28/23 0238 New Bag at 01/28/23 0238    sodium chloride flush 0.9 % injection 10 mL  10 mL IntraVENous 2 times per day Francheska Whitten MD        sodium chloride flush 0.9 % injection 10 mL  10 mL IntraVENous PRN Andreas Lime MD Edwin        0.9 % sodium chloride infusion   IntraVENous PRN Cathy Herrera MD        ondansetron (ZOFRAN-ODT) disintegrating tablet 4 mg  4 mg Oral Q8H PRN Cathy Herrera MD        Or    ondansetron (ZOFRAN) injection 4 mg  4 mg IntraVENous Q6H PRN Cathy Herrera MD        polyethylene glycol (GLYCOLAX) packet 17 g  17 g Oral Daily PRN Cathy Herrera MD        famotidine (PEPCID) tablet 20 mg  20 mg Oral BID Cathy Herrera MD   20 mg at 01/27/23 2046    acetaminophen (TYLENOL) tablet 650 mg  650 mg Oral Q6H PRN Cathy Herrera MD   650 mg at 01/28/23 0827    Or    acetaminophen (TYLENOL) suppository 650 mg  650 mg Rectal Q6H PRN Cathy Herrera MD        potassium chloride (KLOR-CON M) extended release tablet 40 mEq  40 mEq Oral PRN Cathy Herrera MD        Or    potassium bicarb-citric acid (EFFER-K) effervescent tablet 40 mEq  40 mEq Oral PRN Cathy Herrera MD        Or    potassium chloride 10 mEq/100 mL IVPB (Peripheral Line)  10 mEq IntraVENous PRN Cathy Herrera MD        magnesium sulfate 2000 mg in 50 mL IVPB premix  2,000 mg IntraVENous PRN Cathy Herrera MD        busPIRone (BUSPAR) tablet 15 mg  15 mg Oral BID ZAIRA Germain CNP   15 mg at 01/28/23 0827    [Held by provider] apixaban (ELIQUIS) tablet 5 mg  5 mg Oral BID ZAIRA Germain CNP        losartan (COZAAR) tablet 25 mg  25 mg Oral Daily ZAIRA Germain CNP        metoprolol succinate (TOPROL XL) extended release tablet 50 mg  50 mg Oral Daily ZAIRA Germain CNP   50 mg at 01/28/23 0827    therapeutic multivitamin-minerals 1 tablet  1 tablet Oral Daily Salvador Aase Enders-Welter, APRN - CNP        febuxostat (ULORIC) tablet 40 mg (PATIENT SUPPLIED) (Patient Supplied)  40 mg Oral Daily ZAIRA Germain CNP        HYDROcodone-acetaminophen (NORCO) 5-325 MG per tablet 1 tablet  1 tablet Oral Q4H PRN ZAIRA Germain CNP        Or    HYDROcodone-acetaminophen ([address] [place] [address]) 5-325 MG per tablet 2 tablet  2 tablet Oral Q4H PRN Louana Bilberry, APRN - CNP   2 tablet at 01/27/23 2207    morphine (PF) injection 2 mg  2 mg IntraVENous Q4H PRN Louana Bilberry, APRN - CNP   2 mg at 01/28/23 0403       ASSESSMENT:     Principal Problem:    Quadriceps tendon rupture, left, initial encounter  Active Problems:    PAF (paroxysmal atrial fibrillation) (HCC)    SHELIA (obstructive sleep apnea)    Preop cardiovascular exam    History of atrial fibrillation    S/P ablation of atrial fibrillation    Chronic anticoagulation    Left ventricular hypertrophy    Uncontrolled hypertension  Resolved Problems:    * No resolved hospital problems. *      PLAN:     Primary Problem(s): Quadriceps tendon rupture, left, initial encounter  Condition is an acute complicated injury  Condition is stable  Treatment plan: Continue current treatment  Imaging: no further imaging studies ordered today  Medications: Continue w/ Home Meds and Pain Meds as needed  Medication Monitoring / High Risk Medications: none   Orthopedics is following closely  Appreciated Cardiology consultation  Low to intermediate risk, cleared for surgery today  Hold Eliquis, resume post-op  May resume amlodipine post-op  Monitor labs closely  ECHO and MRI were reviewed; quadriceps tendon rupture w/ arthritis with meniscal tear noted. DVT prophylaxis: reason for no prophylaxis: on Eliquis  GI prophylaxis: Famotidine 20 mg BID    Above plan discussed with the patient who agreed to the above plan     Discussed care plan with nurse after getting their input. Please note that this chart was generated using voice recognition Dragon dictation software. Although every effort was made to ensure the accuracy of this automated transcription, some errors in transcription may have occurred.     Jeannette Ruiz MD  1/28/2023 8:47 AM

## 2023-01-28 NOTE — PROGRESS NOTES
Writer called Dr. Britni aYng at this time regarding getting something for patients anxiety. Dr. Britni Yang did not answer, writer left message with return phone number so will await return call.

## 2023-01-28 NOTE — ANESTHESIA POSTPROCEDURE EVALUATION
Department of Anesthesiology  Postprocedure Note    Patient: Armaan Tee  MRN: [id number]  [place]: 1945  Date of evaluation: 1/28/2023      Procedure Summary     Date: 01/28/23 Room / Location: [place] [address] / [facility]    Anesthesia Start: 1178 Anesthesia Stop: 2048    Procedure: QUADRICEPS REPAIR (Left) Diagnosis:       Rupture of left quadriceps tendon, initial encounter      (QUAD TENDON RUPTURE,  LEFT)    Surgeons: Yeni Anthony DO Responsible Provider: ZAIRA Vivar CRNA    Anesthesia Type: general, regional ASA Status: 2          Anesthesia Type: No value filed.     Karon Phase I: Karon Score: 10    Karon Phase II:        Anesthesia Post Evaluation    Patient location during evaluation: PACU  Patient participation: complete - patient participated  Level of consciousness: awake  Airway patency: patent  Nausea & Vomiting: no vomiting and no nausea  Complications: no  Cardiovascular status: blood pressure returned to baseline and hemodynamically stable  Respiratory status: acceptable, spontaneous ventilation and room air  Hydration status: stable  Multimodal analgesia pain management approach

## 2023-01-28 NOTE — OP NOTE
Operative Note      Patient: Ted Hays  YOB: 1945  MRN: [id number]    Date of Procedure: 1/28/2023    Pre-Op Diagnosis:   Left quadriceps tendon rupture    Post-Op Diagnosis: Same with medial and lateral retinacular tears       Procedure(s):  QUADRICEPS REPAIR  Left quadriceps tendon repair  Left medial and lateral retinacular repair    Surgeon(s):  Peterson Estrada DO    Assistant:   First Assistant: Kamran Martinez    Anesthesia: Pre-op regional nerve block, general    Estimated Blood Loss (mL): less than 50     Complications: None    Specimens:   * No specimens in log *    Implants:  Implant Name Type Inv. Item Serial No.  Lot No. LRB No. Used Action   SYSTEM FIX LIGMNT AUG REP Vagras Mason - [id number]  SYSTEM FIX LIGMNT AUG REP Bibi PATEL-WD [id number] Left 1 Implanted         Drains: * No LDAs found *    Findings: As above    Indications:   Patient is a 69-year-old male who slipped and fell on the ice yesterday, injuring the left knee. Patient was found to have a left distal quadriceps tendon rupture. Patient was subsequently admitted and Orthopedics was consulted. We discussed surgical and nonsurgical treatment options. Surgical treatment with left distal quadriceps tendon repair was recommended. We discussed risks, benefits, alternatives to the surgery. Risks to the procedure include bleeding, infection, damage to surrounding neurovascular and soft tissue structures, pain, stiffness, weakness, need for further treatment, adverse reaction anesthesia, DVT/ PE, loss of limb, loss of life. The patient had all questions answered to his satisfaction, he elected to proceed with the surgery. Written informed consent was obtained. Patient was medically stable to proceed with surgery. Detailed Description of Procedure:     Patient was brought to the perioperative area. He was greeted by the surgery staff and anesthesia.   He was also greeted by myself, and I initialed the operative extremity. The patient all questions answered to his satisfaction, he elected to proceed with the planned surgery. Patient underwent preoperative regional nerve block by the anesthesia staff. Patient was taken to the operative suite. He was laid supine on the operating table. All bony prominences were well-padded. He underwent general anesthesia by the anesthesia staff. Preoperative antibiotics were administered. A well-padded pneumatic tourniquet was placed on patient's left proximal thigh. A bump was placed under the left hip and the left lower extremity was placed on bone foam.  The left lower extremity was prepped and draped in normal sterile orthopedic fashion. A time-out was performed, and all present in the room were in agreement with correct patient, procedure, and operative extremity. There were no voiced concerns. Sterile skin marker was used to identify an incision site over the proximal portion of the patella and distal quadriceps tendon. Esmarch was used to exsanguinate the left lower extremity. Tourniquet was inflated 250 mm of mercury. A 15 blade scalpel used to incise the skin. Subcutaneous dissection was performed dissecting scissors down to the quadriceps tendon. There is obvious complete rupture of the distal quadriceps tendon from the patella. There is evidence of medial and lateral retinacular tears. Significant amount of hematoma was debrided from the joint and injury site. The knee joint was copiously irrigated with normal saline. Tendon edges were debrided. The patella was debrided and superior pole was decorticated with a rasp. 2 Arthrex FiberTapes were passed through the distal quadriceps tendon and a running Krackow fashion. Drill holes were placed in the superior pole the patella and were tapped.   Suture tape limbs were passed through 4.75 mm Arthrex SwiveLock anchors and the anchors were secured in the bone tunnels. Excellent refill approximation of the distal quadriceps tendon was appreciated. Excellent repair was appreciated with direct tendon to bone contact. Pre loaded 2. Fiber wire suture was then passed through the tendon for additional fixation. Medial and lateral retinaculum were repaired with 1. Vicryl suture. The knee was able to be flexed to at least 70° without any evidence of disruption of the repair. Wound was copiously irrigated with normal saline. 30 mL of 0.5% Marcaine with epi were injected into the skin and subcutaneous tissue for local anesthesia. Two 0 Vicryl suture was used to close the subcutaneous layer. Skin was closed with staples. Sterile dressing was applied with a Mepilex dressing. Ace wrap was applied to the knee and the knee immobilizer was reapplied. Patient was reversed of anesthesia. He tolerated the procedure well and without complications. He was taken to recovery in stable condition. Patient may be 50% weight-bearing on the left leg with use of a walker or crutches. Must use the knee immobilizer at all times. Patient may resume his Eliquis tomorrow from an orthopedic standpoint. PT/OT will be consulted. Anticipate potential discharge to home tomorrow pending patient's recovery and progress.     Electronically signed by Abida Menon DO on 1/28/2023 at 11:39 AM

## 2023-01-28 NOTE — PROGRESS NOTES
Pt returned to [facility] room 310 from surgery. Received report from [facility]. Pt vital signs and neurovascular checks completed at this time. Pt complaining of pain, PRN pain medication to be given once orders are verified. Left knee dressing clean, dry and intact. SCDs placed on bilateral feet/leg. Pt denies any needs at this time, family at bedside, care ongoing.

## 2023-01-28 NOTE — PLAN OF CARE
Problem: Discharge Planning  Goal: Discharge to home or other facility with appropriate resources  Outcome: Progressing  Flowsheets (Taken 1/28/2023 1207)  Discharge to home or other facility with appropriate resources: Identify barriers to discharge with patient and caregiver  Note: Pt from home, discharge plan is to return home. Problem: Pain  Goal: Verbalizes/displays adequate comfort level or baseline comfort level  Outcome: Progressing  Flowsheets (Taken 1/28/2023 1207)  Verbalizes/displays adequate comfort level or baseline comfort level:   Encourage patient to monitor pain and request assistance   Assess pain using appropriate pain scale   Administer analgesics based on type and severity of pain and evaluate response   Implement non-pharmacological measures as appropriate and evaluate response  Note: Pain assessed every four hours and as needed using 0-10 pain scale. Pt educated on scale and uses scale appropriately. Encourage pt to notify staff of pain before pain becomes uncontrollable. Correlate periods of heavy activity after pain medication administration. Use pharmacological and non pharmacological methods for pain relief such as: warm blankets, ice, television, reading, or rest.        Problem: Safety - Adult  Goal: Free from fall injury  Outcome: Progressing  Flowsheets (Taken 1/28/2023 1207)  Free From Fall Injury: Instruct family/caregiver on patient safety  Note: Call light in reach at all times, frequent checks, bed in lowest position, wheels of bed and chair locked, non skid footwear on, appropriate transfer techniques, personal items within reach, walkways free of obstructions, fall risk armband and sign displayed, Howell fall risk score per protocol. No falls this shift, care ongoing.

## 2023-01-28 NOTE — PROGRESS NOTES
Writer to bedside to complete afternoon assessment. Upon entry to room, pt awake in bed, respirations even and unlabored while on room air. Vitals obtained and assessment completed, see flow sheet for details. Pt complaining of pain, PRN medication to be given shortly. BP elevated, Losartan given at this time. Pt assisted x1 to use urinal at this time. Surgery Ace wrap dressing remains clean, cry and intact on the left leg and immobilizer remains on at this time. SCD's on bilateral lower extremities. Bilateral lower extremities have trace, non-pitting edema. Pt denies needs from writer at this time. Call light in reach. Care ongoing.

## 2023-01-28 NOTE — PROGRESS NOTES
Writer to bedside to complete morning assessment. Upon entry to room, pt awake in bed, respirations even and unlabored while on room air. Vitals obtained and assessment completed, see flow sheet for details. BP elevated. Pt complaining of pain. Bilateral lower extremities have trace, non-pitting edema. Leg leg has Immobilizer in place. Pt informed on surgery time. Pt assisted stand by to use the urinal at this time. Pt denies needs from writer at this time. Call light in reach. Care ongoing.

## 2023-01-28 NOTE — PROGRESS NOTES
Department of Orthopedic Surgery  Attending Progress Note      SUBJECTIVE      Patient seen and examined. Pain is controlled. He denies any new concerns. OBJECTIVE    Physical    VITALS:  BP (!) 165/87   Pulse 77   Temp 97.5 °F (36.4 °C) (Temporal)   Resp 16   Ht 6' (1.829 m)   Wt 240 lb (108.9 kg)   SpO2 95%   BMI 32.55 kg/m²     General: Patient is alert and oriented. No acute distress. Resting comfortably in bed. Left knee: Mild soft tissue swelling and joint effusion. Palpable defect at the proximal patella/distal quadricep. Sensation intact in the saphenous, sural, tibial, superficial/deep peroneal nerves. Motor function intact with ankle dorsiflexion/plantarflexion, wiggling the toes. Distal pulses are intact.     Data    CBC:   Lab Results   Component Value Date/Time    WBC 7.8 01/28/2023 05:20 AM    RBC 4.41 01/28/2023 05:20 AM    HGB 13.9 01/28/2023 05:20 AM    HCT 40.3 01/28/2023 05:20 AM    MCV 91.4 01/28/2023 05:20 AM    MCH 31.5 01/28/2023 05:20 AM    MCHC 34.5 01/28/2023 05:20 AM    RDW 12.5 01/28/2023 05:20 AM     01/28/2023 05:20 AM    MPV 9.9 01/28/2023 05:20 AM     Current Inpatient Medications    Current Facility-Administered Medications: 0.9 % sodium chloride infusion, , IntraVENous, Continuous  sodium chloride flush 0.9 % injection 10 mL, 10 mL, IntraVENous, 2 times per day  sodium chloride flush 0.9 % injection 10 mL, 10 mL, IntraVENous, PRN  0.9 % sodium chloride infusion, , IntraVENous, PRN  ondansetron (ZOFRAN-ODT) disintegrating tablet 4 mg, 4 mg, Oral, Q8H PRN **OR** ondansetron (ZOFRAN) injection 4 mg, 4 mg, IntraVENous, Q6H PRN  polyethylene glycol (GLYCOLAX) packet 17 g, 17 g, Oral, Daily PRN  famotidine (PEPCID) tablet 20 mg, 20 mg, Oral, BID  acetaminophen (TYLENOL) tablet 650 mg, 650 mg, Oral, Q6H PRN **OR** acetaminophen (TYLENOL) suppository 650 mg, 650 mg, Rectal, Q6H PRN  potassium chloride (KLOR-CON M) extended release tablet 40 mEq, 40 mEq, Oral, PRN **OR** potassium bicarb-citric acid (EFFER-K) effervescent tablet 40 mEq, 40 mEq, Oral, PRN **OR** potassium chloride 10 mEq/100 mL IVPB (Peripheral Line), 10 mEq, IntraVENous, PRN  magnesium sulfate 2000 mg in 50 mL IVPB premix, 2,000 mg, IntraVENous, PRN  busPIRone (BUSPAR) tablet 15 mg, 15 mg, Oral, BID  [Held by provider] apixaban (ELIQUIS) tablet 5 mg, 5 mg, Oral, BID  losartan (COZAAR) tablet 25 mg, 25 mg, Oral, Daily  metoprolol succinate (TOPROL XL) extended release tablet 50 mg, 50 mg, Oral, Daily  therapeutic multivitamin-minerals 1 tablet, 1 tablet, Oral, Daily  febuxostat (ULORIC) tablet 40 mg (PATIENT SUPPLIED) (Patient Supplied), 40 mg, Oral, Daily  HYDROcodone-acetaminophen (NORCO) 5-325 MG per tablet 1 tablet, 1 tablet, Oral, Q4H PRN **OR** HYDROcodone-acetaminophen (NORCO) 5-325 MG per tablet 2 tablet, 2 tablet, Oral, Q4H PRN  morphine (PF) injection 2 mg, 2 mg, IntraVENous, Q4H PRN    ASSESSMENT AND PLAN      Left complete quadriceps tendon rupture    Diagnosis for this patient was discussed with him in detail. Imaging and labs have been reviewed. We discussed surgical and nonsurgical treatment options and it was recommended he undergo repair of the ruptured quadricep tendon. Risks, benefits, and alternatives were discussed. Patient had all questions answered to his satisfaction, and he elected to proceed. Informed consent was obtained. Patient has been medically cleared and received cardiac clearance as well. We will plan to proceed with surgical treatment this morning. Will need to work with physical and occupational therapy at some point postoperatively. Patient ideally would like to be discharged to home, which I think is reasonable as long as he is able to mobilize safely and effectively. Anticipate potential discharge to home tomorrow as long as he is doing well.

## 2023-01-28 NOTE — ANESTHESIA PROCEDURE NOTES
Peripheral Block    Patient location during procedure: pre-op  Reason for block: post-op pain management and at surgeon's request  Start time: 1/28/2023 9:32 AM  End time: 1/28/2023 9:36 AM  Staffing  Performed: resident/CRNA   Resident/CRNA: ZAIRA Caruso CRNA  Preanesthetic Checklist  Completed: patient identified, IV checked, site marked, risks and benefits discussed, surgical/procedural consents, equipment checked, pre-op evaluation, timeout performed, anesthesia consent given, oxygen available, monitors applied/VS acknowledged and blood product R/B/A discussed and consented  Peripheral Block   Prep: ChloraPrep  Provider prep: mask and sterile gloves  Patient monitoring: continuous pulse ox, IV access and responsive to questions  Block type: Saphenous  Laterality: left  Injection technique: single-shot  Guidance: ultrasound guided  Local infiltration: ropivacaine and decadron  Infiltration strength: 0.5 %  Local infiltration: ropivacaine and decadron  Dose: 20 mLDose: 1 mL    Needle   Needle type:  Other   Needle gauge: 22 G  Needle localization: ultrasound guidance  Needle length: 8 cmOther needle type: [place] TAP  Assessment   Injection assessment: negative aspiration for heme, no paresthesia on injection, local visualized surrounding nerve on ultrasound, no intravascular symptoms and low pressure verified by pressure monitor  Paresthesia pain: none  Slow fractionated injection: yes  Hemodynamics: stable and unstable  Real-time US image taken/store: yes  Outcomes: uncomplicated and patient tolerated procedure well    Additional Notes  20 mL 0.5% ropivacaine- 10 mg decadron

## 2023-01-29 VITALS
DIASTOLIC BLOOD PRESSURE: 80 MMHG | HEIGHT: 72 IN | WEIGHT: 238 LBS | BODY MASS INDEX: 32.23 KG/M2 | RESPIRATION RATE: 16 BRPM | HEART RATE: 76 BPM | SYSTOLIC BLOOD PRESSURE: 145 MMHG | OXYGEN SATURATION: 97 % | TEMPERATURE: 97.5 F

## 2023-01-29 LAB
ABSOLUTE EOS #: <0.03 K/UL (ref 0–0.44)
ABSOLUTE IMMATURE GRANULOCYTE: 0.1 K/UL (ref 0–0.3)
ABSOLUTE LYMPH #: 1.09 K/UL (ref 1.1–3.7)
ABSOLUTE MONO #: 1.14 K/UL (ref 0.1–1.2)
ALBUMIN SERPL-MCNC: 4 G/DL (ref 3.5–5.2)
ALBUMIN/GLOBULIN RATIO: 1.4 (ref 1–2.5)
ALP BLD-CCNC: 63 U/L (ref 40–129)
ALT SERPL-CCNC: 19 U/L (ref 5–41)
ANION GAP SERPL CALCULATED.3IONS-SCNC: 12 MMOL/L (ref 9–17)
AST SERPL-CCNC: 22 U/L
BASOPHILS # BLD: 0 % (ref 0–2)
BASOPHILS ABSOLUTE: 0.03 K/UL (ref 0–0.2)
BILIRUB SERPL-MCNC: 0.6 MG/DL (ref 0.3–1.2)
BUN BLDV-MCNC: 17 MG/DL (ref 8–23)
BUN/CREAT BLD: 20 (ref 9–20)
CALCIUM SERPL-MCNC: 8.8 MG/DL (ref 8.6–10.4)
CHLORIDE BLD-SCNC: 108 MMOL/L (ref 98–107)
CO2: 20 MMOL/L (ref 20–31)
CREAT SERPL-MCNC: 0.83 MG/DL (ref 0.7–1.2)
EOSINOPHILS RELATIVE PERCENT: 0 % (ref 1–4)
GFR SERPL CREATININE-BSD FRML MDRD: >60 ML/MIN/1.73M2
GLUCOSE BLD-MCNC: 143 MG/DL (ref 70–99)
HCT VFR BLD CALC: 38.2 % (ref 40.7–50.3)
HEMOGLOBIN: 13.4 G/DL (ref 13–17)
IMMATURE GRANULOCYTES: 1 %
LYMPHOCYTES # BLD: 6 % (ref 24–43)
MCH RBC QN AUTO: 31.9 PG (ref 25.2–33.5)
MCHC RBC AUTO-ENTMCNC: 35.1 G/DL (ref 28.4–34.8)
MCV RBC AUTO: 91 FL (ref 82.6–102.9)
MONOCYTES # BLD: 6 % (ref 3–12)
NRBC AUTOMATED: 0 PER 100 WBC
PDW BLD-RTO: 12.4 % (ref 11.8–14.4)
PLATELET # BLD: 184 K/UL (ref 138–453)
PMV BLD AUTO: 10.3 FL (ref 8.1–13.5)
POTASSIUM SERPL-SCNC: 4.2 MMOL/L (ref 3.7–5.3)
RBC # BLD: 4.2 M/UL (ref 4.21–5.77)
SEG NEUTROPHILS: 87 % (ref 36–65)
SEGMENTED NEUTROPHILS ABSOLUTE COUNT: 15.94 K/UL (ref 1.5–8.1)
SODIUM BLD-SCNC: 140 MMOL/L (ref 135–144)
TOTAL PROTEIN: 6.8 G/DL (ref 6.4–8.3)
WBC # BLD: 18.3 K/UL (ref 3.5–11.3)

## 2023-01-29 PROCEDURE — 6370000000 HC RX 637 (ALT 250 FOR IP): Performed by: ORTHOPAEDIC SURGERY

## 2023-01-29 PROCEDURE — 94761 N-INVAS EAR/PLS OXIMETRY MLT: CPT

## 2023-01-29 PROCEDURE — 97162 PT EVAL MOD COMPLEX 30 MIN: CPT

## 2023-01-29 PROCEDURE — 6360000002 HC RX W HCPCS: Performed by: ORTHOPAEDIC SURGERY

## 2023-01-29 PROCEDURE — 97535 SELF CARE MNGMENT TRAINING: CPT

## 2023-01-29 PROCEDURE — 6370000000 HC RX 637 (ALT 250 FOR IP): Performed by: STUDENT IN AN ORGANIZED HEALTH CARE EDUCATION/TRAINING PROGRAM

## 2023-01-29 PROCEDURE — 2580000003 HC RX 258: Performed by: ORTHOPAEDIC SURGERY

## 2023-01-29 PROCEDURE — 85025 COMPLETE CBC W/AUTO DIFF WBC: CPT

## 2023-01-29 PROCEDURE — 36415 COLL VENOUS BLD VENIPUNCTURE: CPT

## 2023-01-29 PROCEDURE — 97166 OT EVAL MOD COMPLEX 45 MIN: CPT

## 2023-01-29 PROCEDURE — 80053 COMPREHEN METABOLIC PANEL: CPT

## 2023-01-29 RX ORDER — LOSARTAN POTASSIUM 50 MG/1
50 TABLET ORAL DAILY
Status: DISCONTINUED | OUTPATIENT
Start: 2023-01-29 | End: 2023-01-29 | Stop reason: HOSPADM

## 2023-01-29 RX ORDER — OXYCODONE HYDROCHLORIDE AND ACETAMINOPHEN 5; 325 MG/1; MG/1
1 TABLET ORAL EVERY 6 HOURS PRN
Qty: 20 TABLET | Refills: 0 | Status: SHIPPED | OUTPATIENT
Start: 2023-01-29 | End: 2023-01-29 | Stop reason: SDUPTHER

## 2023-01-29 RX ORDER — LOSARTAN POTASSIUM 50 MG/1
50 TABLET ORAL DAILY
Qty: 30 TABLET | Refills: 0 | Status: SHIPPED | OUTPATIENT
Start: 2023-01-30

## 2023-01-29 RX ORDER — OXYCODONE HYDROCHLORIDE AND ACETAMINOPHEN 5; 325 MG/1; MG/1
1 TABLET ORAL EVERY 6 HOURS PRN
Qty: 20 TABLET | Refills: 0 | Status: SHIPPED | OUTPATIENT
Start: 2023-01-29 | End: 2023-02-03

## 2023-01-29 RX ORDER — LOSARTAN POTASSIUM 50 MG/1
50 TABLET ORAL DAILY
Qty: 30 TABLET | Refills: 0 | Status: SHIPPED | OUTPATIENT
Start: 2023-01-30 | End: 2023-01-29 | Stop reason: SDUPTHER

## 2023-01-29 RX ADMIN — FAMOTIDINE 20 MG: 20 TABLET, FILM COATED ORAL at 08:58

## 2023-01-29 RX ADMIN — SODIUM CHLORIDE, PRESERVATIVE FREE 10 ML: 5 INJECTION INTRAVENOUS at 08:58

## 2023-01-29 RX ADMIN — BUSPIRONE HYDROCHLORIDE 15 MG: 5 TABLET ORAL at 08:58

## 2023-01-29 RX ADMIN — LOSARTAN POTASSIUM 50 MG: 50 TABLET, FILM COATED ORAL at 08:58

## 2023-01-29 RX ADMIN — METOPROLOL SUCCINATE 50 MG: 50 TABLET, EXTENDED RELEASE ORAL at 08:58

## 2023-01-29 RX ADMIN — APIXABAN 5 MG: 5 TABLET, FILM COATED ORAL at 08:58

## 2023-01-29 RX ADMIN — ACETAMINOPHEN 650 MG: 325 TABLET ORAL at 12:16

## 2023-01-29 RX ADMIN — Medication 2000 MG: at 01:31

## 2023-01-29 RX ADMIN — ACETAMINOPHEN 650 MG: 325 TABLET ORAL at 05:42

## 2023-01-29 RX ADMIN — MULTIPLE VITAMINS W/ MINERALS TAB 1 TABLET: TAB at 08:58

## 2023-01-29 ASSESSMENT — PAIN DESCRIPTION - FREQUENCY: FREQUENCY: CONTINUOUS

## 2023-01-29 ASSESSMENT — PAIN DESCRIPTION - PAIN TYPE: TYPE: SURGICAL PAIN

## 2023-01-29 ASSESSMENT — PAIN DESCRIPTION - LOCATION
LOCATION: LEG
LOCATION: KNEE

## 2023-01-29 ASSESSMENT — PAIN SCALES - GENERAL
PAINLEVEL_OUTOF10: 5
PAINLEVEL_OUTOF10: 4
PAINLEVEL_OUTOF10: 4
PAINLEVEL_OUTOF10: 5

## 2023-01-29 ASSESSMENT — PAIN DESCRIPTION - DESCRIPTORS
DESCRIPTORS: DULL
DESCRIPTORS: DISCOMFORT

## 2023-01-29 ASSESSMENT — PAIN - FUNCTIONAL ASSESSMENT: PAIN_FUNCTIONAL_ASSESSMENT: PREVENTS OR INTERFERES SOME ACTIVE ACTIVITIES AND ADLS

## 2023-01-29 ASSESSMENT — PAIN DESCRIPTION - ORIENTATION
ORIENTATION: LEFT
ORIENTATION: LEFT

## 2023-01-29 ASSESSMENT — PAIN DESCRIPTION - ONSET: ONSET: ON-GOING

## 2023-01-29 NOTE — PROGRESS NOTES
Patient stands at bedside fairly well to use the urinal. His balance is somewhat improved and he continues to deny pain. Writer remains standby while patient gets back into bed. Foot pump and leg pump are on while patient is in bed. Call light and bedside table remain within reach. Will continue to monitor and assess.

## 2023-01-29 NOTE — PROGRESS NOTES
[address], [address]    Progress Note    Date:   1/29/2023  Patient name:  Olimpia Scanlon  Date of admission:  1/27/2023  8:43 AM  MRN:   [id number]  YOB: 1945    SUBJECTIVE/Last 24 hours update:     Patient seen and examined at the bed side , no new acute events overnight except that he was feeling more anxious than normal which resolved. Pain is well controlled, tolerating PO intake well and no new complains. Minimal knee pain noted. Notes from nursing staff and Consults had been reviewed, and the overnight progress had been checked with the nursing staff as well. REVIEW OF SYSTEMS:      CONSTITUTIONAL:  no fevers, no headcahes  EYES: negative for blury vision  HEENT: No headaches, No nasal congestion, no difficulty swallowing  RESPIRATORY:negative for dyspnea, no wheezing, no Cough  CARDIOVASCULAR: negative for chest pain, no palpitations  GASTROINTESTINAL: no nausea, no vomiting, no change in bowel habits, no abdominal pain   GENITOURINARY: negative for dysuria, no hematuria   MUSCULOSKELETAL: no joint pains, no muscle aches, no swelling of joints or extremities, left knee pain/discomfort  NEUROLOGICAL: No  Weakness or numbness      PAST MEDICAL HISTORY:      has a past medical history of Atrial fibrillation ([address] Erectile dysfunction, Gout, Hypertension, Obesity, SHELIA (obstructive sleep apnea), PAF (paroxysmal atrial fibrillation) ([address] and Unspecified sleep apnea. PAST SURGICAL HISTORY:      has a past surgical history that includes Colonoscopy; eye surgery (Left, 01/01/2010); Cardiac electrophysiology study and ablation (03/2020); and Quadriceps repair (Left, 01/28/2023). SOCIAL HISTORY:      reports that he has never smoked. He has never used smokeless tobacco. He reports current alcohol use of about 7.0 standard drinks per week. He reports that he does not use drugs. TOBACCO:   reports that he has never smoked.  He has never used smokeless tobacco.  ETOH:   reports current alcohol use of about 7.0 standard drinks per week. FAMILY HISTORY:     family history includes Arthritis in his mother, sister, and sister; Diabetes in his father; High Blood Pressure in his father and mother. Problem Relation Age of Onset    Arthritis Mother     High Blood Pressure Mother     Diabetes Father     High Blood Pressure Father     Arthritis Sister     Arthritis Sister      HOME MEDICATIONS:      Prior to Admission medications    Medication Sig Start Date End Date Taking? Authorizing Provider   metoprolol succinate (TOPROL XL) 50 MG extended release tablet TAKE 1 TABLET DAILY 1/9/23  Yes Historical Provider, MD   losartan (COZAAR) 25 MG tablet Take 25 mg by mouth daily 1/8/23  Yes Historical Provider, MD   busPIRone (BUSPAR) 15 MG tablet Take 15 mg by mouth 2 times daily 11/12/22  Yes Historical Provider, MD   Multiple Vitamins-Minerals (THERAPEUTIC MULTIVITAMIN-MINERALS) tablet Take 1 tablet by mouth daily    Historical Provider, MD   predniSONE (DELTASONE) 10 MG tablet Take 1 tablet by mouth 3 times daily as needed (Gout) 10/11/16   Tereso Irene MD   tadalafil (CIALIS) 20 MG tablet Take 1 tablet by mouth as needed for Erectile Dysfunction 9/1/16   Tereso Irene MD   ULORIC 40 MG TABS tablet TAKE 1 TABLET DAILY 5/13/16   Tereso Irene MD   ELIQUIS 5 MG TABS tablet Take 5 mg by mouth 2 times daily.  8/25/14   Historical Provider, MD       ALLERGIES:     Diltiazem hcl      OBJECTIVE:       Vitals:    01/29/23 0015 01/29/23 0300 01/29/23 0346 01/29/23 0728   BP: (!) 164/87 (!) 170/99  (!) 145/80   Pulse: 81 92  76   Resp: 15   16   Temp: 97.5 °F (36.4 °C)   97.5 °F (36.4 °C)   TempSrc:    Temporal   SpO2: 94%   97%   Weight:   238 lb (108 kg)    Height:             Intake/Output Summary (Last 24 hours) at 1/29/2023 0812  Last data filed at 1/29/2023 0806  Gross per 24 hour   Intake 1900 ml   Output 2925 ml   Net -1025 ml       PHYSICAL EXAM:  General Appearance Alert , awake , not in acute distress  HEENT - Head is normocephalic, atraumatic. Lungs - Bilateral equal air entry , no wheezes, rales or rhonchi, aeration good  Cardiovascular - Heart sounds are normal.  Regular rhythm, normal rate without murmur, gallop or rub.   Abdomen - Soft, nontender, nondistended, no masses or organomegaly  Neurologic - There are no new focal motor or sensory deficits  Skin - No bruising or bleeding on exposed skin area  Extremities - No cyanosis, clubbing or edema, brace is c/di, minimal tenderness noted with palpation over the left knee, c/d/i    DIAGNOSTICS:     Laboratory Testing:    See [employer] EMR for lab data    Recent Results (from the past 24 hour(s))   CBC auto differential    Collection Time: 01/29/23  5:40 AM   Result Value Ref Range    WBC 18.3 (H) 3.5 - 11.3 k/uL    RBC 4.20 (L) 4.21 - 5.77 m/uL    Hemoglobin 13.4 13.0 - 17.0 g/dL    Hematocrit 38.2 (L) 40.7 - 50.3 %    MCV 91.0 82.6 - 102.9 fL    MCH 31.9 25.2 - 33.5 pg    MCHC 35.1 (H) 28.4 - 34.8 g/dL    RDW 12.4 11.8 - 14.4 %    Platelets 536 631 - 742 k/uL    MPV 10.3 8.1 - 13.5 fL    NRBC Automated 0.0 0.0 per 100 WBC    Seg Neutrophils 87 (H) 36 - 65 %    Lymphocytes 6 (L) 24 - 43 %    Monocytes 6 3 - 12 %    Eosinophils % 0 (L) 1 - 4 %    Basophils 0 0 - 2 %    Immature Granulocytes 1 (H) 0 %    Segs Absolute 15.94 (H) 1.50 - 8.10 k/uL    Absolute Lymph # 1.09 (L) 1.10 - 3.70 k/uL    Absolute Mono # 1.14 0.10 - 1.20 k/uL    Absolute Eos # <0.03 0.00 - 0.44 k/uL    Basophils Absolute 0.03 0.00 - 0.20 k/uL    Absolute Immature Granulocyte 0.10 0.00 - 0.30 k/uL   Comprehensive Metabolic Panel w/ Reflex to MG    Collection Time: 01/29/23  5:40 AM   Result Value Ref Range    Glucose 143 (H) 70 - 99 mg/dL    BUN 17 8 - 23 mg/dL    Creatinine 0.83 0.70 - 1.20 mg/dL    Est, Glom Filt Rate >60 >60 mL/min/1.73m2    Bun/Cre Ratio 20 9 - 20    Calcium 8.8 8.6 - 10.4 mg/dL    Sodium 140 135 - 144 mmol/L    Potassium 4.2 3.7 - 5.3 mmol/L    Chloride 108 (H) 98 - 107 mmol/L    CO2 20 20 - 31 mmol/L    Anion Gap 12 9 - 17 mmol/L    Alkaline Phosphatase 63 40 - 129 U/L    ALT 19 5 - 41 U/L    AST 22 <40 U/L    Total Bilirubin 0.6 0.3 - 1.2 mg/dL    Total Protein 6.8 6.4 - 8.3 g/dL    Albumin 4.0 3.5 - 5.2 g/dL    Albumin/Globulin Ratio 1.4 1.0 - 2.5         Current Facility-Administered Medications   Medication Dose Route Frequency Provider Last Rate Last Admin    losartan (COZAAR) tablet 50 mg  50 mg Oral Daily Sanjiv Barragan MD        LORazepam (ATIVAN) injection 0.5 mg  0.5 mg IntraVENous Once Alex Miner DO        oxyCODONE (ROXICODONE) immediate release tablet 5 mg  5 mg Oral Q4H PRN Alex Miner,         Or    oxyCODONE (ROXICODONE) immediate release tablet 10 mg  10 mg Oral Q4H PRN Alex Miner, DO        ondansetron (ZOFRAN-ODT) disintegrating tablet 4 mg  4 mg Oral Q8H PRN Alex Miner DO        Or    [facility] injection 4 mg  4 mg IntraVENous Q6H PRN Alex Miner, DO        LORazepam (ATIVAN) tablet 0.5 mg  0.5 mg Oral Q6H PRN Sanjiv Barragan MD   0.5 mg at 01/28/23 1744    sodium chloride flush 0.9 % injection 10 mL  10 mL IntraVENous 2 times per day Alex Miner DO        sodium chloride flush 0.9 % injection 10 mL  10 mL IntraVENous PRN Alex Miner, DO        0.9 % sodium chloride infusion   IntraVENous PRN Alex Miner, DO        polyethylene glycol (GLYCOLAX) packet 17 g  17 g Oral Daily PRN Alex Miner, DO        famotidine (PEPCID) tablet 20 mg  20 mg Oral BID Alex Miner DO   20 mg at 01/28/23 2038    acetaminophen (TYLENOL) tablet 650 mg  650 mg Oral Q6H PRN Alex Miner, DO   650 mg at 01/29/23 0542    Or    acetaminophen (TYLENOL) suppository 650 mg  650 mg Rectal Q6H PRN Alex Miner, DO        potassium chloride (KLOR-CON M) extended release tablet 40 mEq  40 mEq Oral PRN Alex Miner, DO        Or    potassium bicarb-citric acid (EFFER-K) effervescent tablet 40 mEq 40 mEq Oral PRN Jeneane Inches, DO        Or    potassium chloride 10 mEq/100 mL IVPB (Peripheral Line)  10 mEq IntraVENous PRN Jeneane Inches, DO        magnesium sulfate 2000 mg in 50 mL IVPB premix  2,000 mg IntraVENous PRN Jeneane Inches, DO        busPIRone (BUSPAR) tablet 15 mg  15 mg Oral BID Jeneane Inches, DO   15 mg at 01/28/23 2038    apixaban (ELIQUIS) tablet 5 mg  5 mg Oral BID Jeneane Inches, DO        metoprolol succinate (TOPROL XL) extended release tablet 50 mg  50 mg Oral Daily Jeneane Inches, DO   50 mg at 01/28/23 0827    therapeutic multivitamin-minerals 1 tablet  1 tablet Oral Daily Jeneane Inches, DO        febuxostat (ULORIC) tablet 40 mg (PATIENT SUPPLIED)  (Patient Supplied)  40 mg Oral Daily Jeneane Inches, DO        morphine (PF) injection 2 mg  2 mg IntraVENous Q4H PRN Jeneane Inches, DO   2 mg at 01/28/23 1252       ASSESSMENT:     Principal Problem:    Quadriceps tendon rupture, left, initial encounter  Active Problems:    PAF (paroxysmal atrial fibrillation) ([facility])    SHELIA (obstructive sleep apnea)    Preop cardiovascular exam    History of atrial fibrillation    S/P ablation of atrial fibrillation    Chronic anticoagulation    Left ventricular hypertrophy    Uncontrolled hypertension  Resolved Problems:    * No resolved hospital problems. *      PLAN:     Primary Problem(s): Quadriceps tendon rupture, left, initial encounter  Condition is an acute complicated injury  Condition is stable  Treatment plan: Continue current treatment  Imaging: no further imaging studies ordered today  Medications: Continue w/ Home Meds and Pain Meds as needed  Medication Monitoring / High Risk Medications: none   Orthopedics is following closely  Resume Eliquis  HTN elevated, incr.  Losartan  D/C IVF  Monitor labs closely  Dispo, likely Home with Home Health today pending PT/Orthopedic Surgeon    DVT prophylaxis: reason for no prophylaxis: on Eliquis  GI prophylaxis: Famotidine 20 mg BID    Above plan discussed with the patient who agreed to the above plan     Discussed care plan with nurse after getting their input. Please note that this chart was generated using voice recognition Dragon dictation software. Although every effort was made to ensure the accuracy of this automated transcription, some errors in transcription may have occurred.     Michelle Swain MD  1/29/2023 8:12 AM

## 2023-01-29 NOTE — PROGRESS NOTES
Occupational Therapy  Facility/Department: [facility] AT THE [facility] MED SURG  Occupational Therapy Initial Assessment    Name: Janeen Gambino  : 1945  MRN: [id number]  Date of Service: 2023    Discharge Recommendations:  Home with assist PRN          Patient Diagnosis(es): The encounter diagnosis was Rupture of left quadriceps muscle, initial encounter. Past Medical History:  has a past medical history of Atrial fibrillation ([facility] Erectile dysfunction, Gout, Hypertension, Obesity, SHELIA (obstructive sleep apnea), PAF (paroxysmal atrial fibrillation) ([facility] and Unspecified sleep apnea. Past Surgical History:  has a past surgical history that includes Colonoscopy; eye surgery (Left, 2010); Cardiac electrophysiology study and ablation (2020); and Quadriceps repair (Left, 2023). Treatment Diagnosis: generalized weakness      Assessment   Performance deficits / Impairments: Decreased functional mobility ; Decreased ADL status; Decreased strength;Decreased endurance;Decreased balance  Assessment: Pt is a 70 y/o male admitted s/p L quadricep tendon repair. Pt is 50% weight bearing L LE with knee immobilizer. Pt presents with decreased functional mobility, endurance, and dynamic standing balance resulting in decreased independence with ADLs. Pt to benefit from OT services to address these deficits and return to PLOF. Treatment Diagnosis: generalized weakness  Prognosis: Good  Decision Making: Medium Complexity  REQUIRES OT FOLLOW-UP: Yes  Activity Tolerance  Activity Tolerance: Patient Tolerated treatment well        Plan   Occupational Therapy Plan  Times Per Day:  Once a day  Days Per Week: 7 Days  Current Treatment Recommendations: Strengthening, ROM, Balance training, Functional mobility training, Endurance training, Patient/Caregiver education & training, Self-Care / ADL  Additional Comments: ther act, ther ex, ADL training     Restrictions  Restrictions/Precautions  Restrictions/Precautions: Weight Bearing, General Precautions  Lower Extremity Weight Bearing Restrictions  Left Lower Extremity Weight Bearing: Partial Weight Bearing  Partial Weight Bearing Percentage Or Pounds: 50% weight bearing    Subjective   General  Chart Reviewed: Yes  Patient assessed for rehabilitation services?: Yes  Family / Caregiver Present: No  Referring Practitioner: Dr. Dixie Camargo  Diagnosis: L quadriceps tendon repair  Subjective  Subjective: Pt reports 4/10 pain, states is just a dull ache  General Comment  Comments: Pt in bed upon OT arrival. Agreeable to OT evaluation. Social/Functional History  Social/Functional History  Lives With: Spouse  Type of Home: Condo  Home Layout: One level  Home Access: Stairs to enter with rails  Entrance Stairs - Number of Steps: 1  Bathroom Shower/Tub: Walk-in shower  Bathroom Toilet: Handicap height  Bathroom Equipment: Grab bars in shower  Home Equipment: [employer], rolling  Has the patient had two or more falls in the past year or any fall with injury in the past year?: No  ADL Assistance: Independent  Homemaking Assistance: Independent  Homemaking Responsibilities: No  Ambulation Assistance: Independent  Transfer Assistance: Independent  Active : Yes  Occupation: Full time employment  Type of Occupation: works for [employer] on [employer]       Objective   Heart Rate: [address]: Monitor; Apical  BP: (!) 145/80  BP Location: Left upper arm  BP Method: Automatic  Patient Position: Sitting;Up in chair  MAP (Calculated): 102  Resp: 16  SpO2: 97 %  O2 Device: None (Room air)             Safety Devices  Type of Devices: Call light within reach; Left in chair;Nurse notified  Bed Mobility Training  Bed Mobility Training: No (pt in bedside chair upon OT arrival)  Balance  Sitting: Intact  Standing: Impaired  Standing - Static: Fair  Standing - Dynamic: Fair  Transfer Training  Transfer Training: Yes  Overall Level of Assistance: Contact-guard assistance  Interventions: Verbal cues (verbal cues for hand placement)  Sit to Stand: Contact-guard assistance  Stand to Sit: Contact-guard assistance  Gait  Overall Level of Assistance: Contact-guard assistance  Assistive Device: Walker, rolling        ADL  Feeding: Setup  Grooming: Setup  UE Bathing: Setup  LE Bathing: Moderate assistance  UE Dressing: Setup  LE Dressing: Moderate assistance  Toileting: Contact guard assistance              Vision  Vision: Within Functional Limits  Hearing  Hearing: Within functional limits  Cognition  Overall Cognitive Status: WNL  Orientation  Overall Orientation Status: Within Normal Limits                  Education Given To: Patient  Education Provided: Role of Therapy;Plan of Care;Transfer Training;ADL Adaptive Strategies  Education Provided Comments: Pt educated on use of tub bench and hand held shower for safety with bathing and shower transfers. Educated on easy slide for assistance with donning compression socks and adaptive strategies for LB dressing  Education Method: Verbal;Demonstration  Barriers to Learning: None  Education Outcome: Verbalized understanding                        G-Code     OutComes Score                                                  AM-PAC Score        AM-[facility] Inpatient Daily Activity Raw Score: 18 (01/29/23 1054)  AM-PAC Inpatient ADL T-Scale Score : 38.66 (01/29/23 1054)  ADL Inpatient CMS 0-100% Score: 46.65 (01/29/23 1054)  ADL Inpatient CMS G-Code Modifier : CK (01/29/23 1054)    Tinneti Score       Goals  Short Term Goals  Time Frame for Short Term Goals: 20 visits  Short Term Goal 1: Pt will perform functional transfers/functional mobility with mod I using LRAD to increase independence with ADLs. Short Term Goal 2: Pt will tolerate 15 minutes or greater ther act/ther ex without rest breaks to increase functional activity tolerance. Short Term Goal 3: Pt will perform LB ADLs with mod I using AE as needed.   Short Term Goal 4: Pt will perform dynamic functional reaching tasks x 10 minutes or greater with good dynamic standing balance.        Therapy Time   Individual Concurrent Group Co-treatment   Time In [address] [place] [address]         Time Out 0958         Minutes C/ [address], [place]

## 2023-01-29 NOTE — PROGRESS NOTES
Another RN called  for consult, and updated writer that he is coming to see pt later this afternoon. Pt updated at this time.

## 2023-01-29 NOTE — PROGRESS NOTES
Vitals and assessment are complete at this time. Writer walked patient through the medications that would be administered tonight and encouraged patient to ask questions about the medications and therapies. Patient denies questions. Patient reports a significant reduction in his anxiety after taking the ativan. He is resting in the chair talking intermittently on the phone. Patient is in good spirits but does have questions for the care team about his restrictions and when he will be able to get back to work. Writer encouraged patient to ask questions with morning rounds but if he forgets to ask something, not to be too shy to ask the nurse for clarification. Due to patient's claustrophobia, writer opened patient's curtain and doors to open the room more. Patient knows to call out if it gets too noisy for him. Call light is within reach and bed alarm set. Patient denies needs at this time. Will continue to monitor and assess.

## 2023-01-29 NOTE — PROGRESS NOTES
Writer reassessed patient's blood pressure and he remains hypertensive. Patient appears slightly restless in bed when he is awake and he has difficulty relaxing his arm and not wiggling and tapping his fingers while his pressure is taking. Patient  wearing his bipap all night and appears to be resting well awhile asleep. He denies pain at this time. Call light and bedside table remain within reach. Will continue to monitor and assess.

## 2023-01-29 NOTE — PROGRESS NOTES
Department of Orthopedic Surgery  Attending Progress Note      SUBJECTIVE patient seen and examined. Pain is well controlled with Tylenol. Patient has worked with physical therapy. He feels comfortable with mobilization. He denies any immediate concerns. Denies chest pain and shortness of breath. OBJECTIVE    Physical    VITALS:  BP (!) 145/80   Pulse 76   Temp 97.5 °F (36.4 °C) (Temporal)   Resp 16   Ht 6' (1.829 m)   Wt 238 lb (108 kg)   SpO2 97%   BMI 32.28 kg/m²     General: Patient is alert and oriented. No acute distress. Sitting comfortably in bedside chair. Left lower extremity: Knee immobilizer is in place. It was opened and the postoperative dressing was evaluated. It is clean, dry, and intact. Mild swelling around the knee. Sensation intact distally in the saphenous, sural, tibial, superficial/deep peroneal nerves. Motor function intact with ankle dorsiflexion/plantarflexion, wiggling toes. Distal pulses are intact.     Data    CBC with Differential:    Lab Results   Component Value Date/Time    WBC 18.3 01/29/2023 05:40 AM    RBC 4.20 01/29/2023 05:40 AM    HGB 13.4 01/29/2023 05:40 AM    HCT 38.2 01/29/2023 05:40 AM     01/29/2023 05:40 AM    MCV 91.0 01/29/2023 05:40 AM    MCH 31.9 01/29/2023 05:40 AM    MCHC 35.1 01/29/2023 05:40 AM    RDW 12.4 01/29/2023 05:40 AM    LYMPHOPCT 6 01/29/2023 05:40 AM    MONOPCT 6 01/29/2023 05:40 AM    BASOPCT 0 01/29/2023 05:40 AM    MONOSABS 1.14 01/29/2023 05:40 AM    LYMPHSABS 1.09 01/29/2023 05:40 AM    EOSABS <0.03 01/29/2023 05:40 AM    BASOSABS 0.03 01/29/2023 05:40 AM     Current Inpatient Medications    Current Facility-Administered Medications: losartan (COZAAR) tablet 50 mg, 50 mg, Oral, Daily  LORazepam (ATIVAN) injection 0.5 mg, 0.5 mg, IntraVENous, Once  oxyCODONE (ROXICODONE) immediate release tablet 5 mg, 5 mg, Oral, Q4H PRN **OR** oxyCODONE (ROXICODONE) immediate release tablet 10 mg, 10 mg, Oral, Q4H PRN  ondansetron (ZOFRAN-ODT) disintegrating tablet 4 mg, 4 mg, Oral, Q8H PRN **OR** ondansetron (ZOFRAN) injection 4 mg, 4 mg, IntraVENous, Q6H PRN  LORazepam (ATIVAN) tablet 0.5 mg, 0.5 mg, Oral, Q6H PRN  sodium chloride flush 0.9 % injection 10 mL, 10 mL, IntraVENous, 2 times per day  sodium chloride flush 0.9 % injection 10 mL, 10 mL, IntraVENous, PRN  0.9 % sodium chloride infusion, , IntraVENous, PRN  polyethylene glycol (GLYCOLAX) packet 17 g, 17 g, Oral, Daily PRN  famotidine (PEPCID) tablet 20 mg, 20 mg, Oral, BID  acetaminophen (TYLENOL) tablet 650 mg, 650 mg, Oral, Q6H PRN **OR** acetaminophen (TYLENOL) suppository 650 mg, 650 mg, Rectal, Q6H PRN  potassium chloride (KLOR-CON M) extended release tablet 40 mEq, 40 mEq, Oral, PRN **OR** potassium bicarb-citric acid (EFFER-K) effervescent tablet 40 mEq, 40 mEq, Oral, PRN **OR** potassium chloride 10 mEq/100 mL IVPB (Peripheral Line), 10 mEq, IntraVENous, PRN  magnesium sulfate 2000 mg in 50 mL IVPB premix, 2,000 mg, IntraVENous, PRN  busPIRone (BUSPAR) tablet 15 mg, 15 mg, Oral, BID  apixaban (ELIQUIS) tablet 5 mg, 5 mg, Oral, BID  metoprolol succinate (TOPROL XL) extended release tablet 50 mg, 50 mg, Oral, Daily  therapeutic multivitamin-minerals 1 tablet, 1 tablet, Oral, Daily  febuxostat (ULORIC) tablet 40 mg (PATIENT SUPPLIED)  (Patient Supplied), 40 mg, Oral, Daily  morphine (PF) injection 2 mg, 2 mg, IntraVENous, Q4H PRN    ASSESSMENT AND PLAN      POD #1 left distal quadriceps tendon repair with retinacular repair    Patient is doing very well postoperatively. No evidence for any immediate concerns. Patient feels comfortable with mobilization. Patient appears medically stable. Patient worked well with therapy. We discussed postoperative care and they had all questions answered to their satisfaction. Patient is stable for discharge from an orthopedic standpoint. Maintain knee immobilizer with the knee locked in extension.   50% weightbearing with the knee in full extension with the knee immobilizer. DVT prophylaxis with HEATH hose, SCDs, mobilization, resume Eliquis. Prescription for a 2 wheeled walker provided    Prescription for Percocet already written    Discharge instructions discussed with the patient. He will need to follow-up 2 weeks postoperatively. Please call with any questions or concerns.

## 2023-01-29 NOTE — PROGRESS NOTES
Writer to bedside to complete morning assessment. Upon entry to room, pt awake up in the chair, respirations even and unlabored while on room air. Vitals obtained and assessment completed, see flow sheet for details. Pt complaining of surgical pain, pt refused any PRN pain medication at this time. Pt has Immobilizer in place on the left lower leg. Surgery Dressing on the left lower leg clean, cry and intact. Pt denies any further needs from writer at this time. Call light in reach. Care ongoing.

## 2023-01-29 NOTE — PROGRESS NOTES
Writer went over discharge instructions with pt and wife at bedside. IV removed. Gege Kaba RN assisted pt with getting dressed at this time.

## 2023-01-29 NOTE — DISCHARGE SUMMARY
[address], [address]    Discharge Summary      NAME:  Michael Worley  :  1945  MRN:  [id number]    Admit date:  2023  Discharge date:  23      Admitting Physician:  Ilya Parks MD    Primary Diagnosis on Admission:   Present on Admission:   Quadriceps tendon rupture, left, initial encounter   Uncontrolled hypertension   PAF (paroxysmal atrial fibrillation) (HCC)   SHELIA (obstructive sleep apnea)   Preop cardiovascular exam   History of atrial fibrillation   S/P ablation of atrial fibrillation   Chronic anticoagulation   Left ventricular hypertrophy      Secondary Diagnoses:  does not have any pertinent problems on file. Admission Condition:  stable     Discharged Condition: good    Hospital Course: The patient was admitted for the management of a left knee pain secondary to left quadriceps tendon rupture and retinacular. Orthopedics Surgery consultation was obtained and MRI was ordered. Cardiology was consulted for pre-operative risk evaluation/clearance and an ECHO was obtained. He was placed NPO and had surgery on 2023 which he tolerated well. He worked with PT/OT and plans for OP therapy/rehab. Today on day of discharge pt feels better with no further complaints. Losartan was adjusted to 50mg. Vitals and Labs are at pts baseline. He will use a walker per Orthopedics Recommendations and eventually complete PT as directed. All consultants involved during this admission are agreeable to d/c. Consults:  cardiology and orthopedic surgery    Significant Diagnostic/theraputic interventions: IVF, MRI, Pain Medications, Orthopedic Surgery (Left quadriceps tendon repair/Left medial-lateral retinacular repair)    Disposition:   home    Instructions to Patient:      Follow up with Carmen Avila MD in  1-2 weeks   Follow up with /Orthopedic Surgery as directed, 1-2 weeks.     Discharge Medications:       Medication List        START taking these medications      oxyCODONE-acetaminophen 5-325 MG per tablet  Commonly known as: Percocet  Take 1 tablet by mouth every 6 hours as needed for Pain for up to 5 days. Recent Orthopedic Surgery, [facility]. Intended supply: 5 days. Take lowest dose possible to manage pain Max Daily Amount: 4 tablets            CHANGE how you take these medications      busPIRone 15 MG tablet  Commonly known as: BUSPAR  What changed: Another medication with the same name was removed. Continue taking this medication, and follow the directions you see here. losartan 50 MG tablet  Commonly known as: COZAAR  Take 1 tablet by mouth daily  Start taking on: January 30, 2023  What changed:   medication strength  how much to take            CONTINUE taking these medications      Eliquis 5 MG Tabs tablet  Generic drug: apixaban     metoprolol succinate 50 MG extended release tablet  Commonly known as: TOPROL XL     predniSONE 10 MG tablet  Commonly known as: DELTASONE  Take 1 tablet by mouth 3 times daily as needed (Gout)     tadalafil 20 MG tablet  Commonly known as: CIALIS  Take 1 tablet by mouth as needed for Erectile Dysfunction     therapeutic multivitamin-minerals tablet     Uloric 40 MG Tabs tablet  Generic drug: febuxostat  TAKE 1 TABLET DAILY            STOP taking these medications      MULTIVITAMIN ADULT PO               Where to Get Your Medications        These medications were sent to [address] [place] [address] [id number] [facility], [address] [place] [address] [address]       Phone: [phone]   losartan 50 MG tablet  oxyCODONE-acetaminophen 5-325 MG per tablet         Send Copies to: Cortes Cornelius MD,    Patient Instructions:    Activity: As directed by Orthopedic Surgeon  Diet: cardiac diet  Wound Care: keep wound clean and dry  Follow up with Cortes Cornelius MD in 1-2 weeks   Follow-up with  in 1-2 weeks as directed    Total time spent on discharge services: 35 minutes  Including the following activities:  Evaluation and Management of patient  Discussion with patient and/or surrogate about current care plan  Coordination with Case Management and/or   Coordination of care with Consultants (if applicable)   Coordination of care with Receiving Facility Physician (if applicable)  Completion of DME forms (if applicable)  Preparation of Discharge Summary  Preparation of Medication Reconciliation  Preparation of Discharge Prescriptions    Please note that this chart was generated using voice recognition Dragon dictation software. Although every effort was made to ensure the accuracy of this automated transcription, some errors in transcription may have occurred.     Lila Hoang MD  1/29/2023 3:45 PM

## 2023-01-29 NOTE — PROGRESS NOTES
Physical Therapy  Facility/Department: [facility] MED SURG  Physical Therapy Initial Assessment    Name: Melly South  : 1945  MRN: [id number]  Date of Service: 2023    Discharge Recommendations:  Continue to assess pending progress, Outpatient PT     Patient Diagnosis(es): The encounter diagnosis was Rupture of left quadriceps muscle, initial encounter. Past Medical History:  has a past medical history of Atrial fibrillation ([address] Erectile dysfunction, Gout, Hypertension, Obesity, SHELIA (obstructive sleep apnea), PAF (paroxysmal atrial fibrillation) ([facility] [address] and Unspecified sleep apnea. Past Surgical History:  has a past surgical history that includes Colonoscopy; eye surgery (Left, 2010); Cardiac electrophysiology study and ablation (2020); and Quadriceps repair (Left, 2023). Assessment   Body Structures, Functions, Activity Limitations Requiring Skilled Therapeutic Intervention: Decreased functional mobility ; Decreased ADL status; Decreased ROM; Decreased strength;Decreased balance;Decreased coordination  Assessment: PT evaluation completed. Per Doctors orders Patient L LE 50% and immobilizer to be worn at all times. Patient completed sit to stand transfers with CGAx1. Patient was able to ambulate 30 feet with RW and contact guard assistance with patient demonstrating appropriate and safe usage of RW. Patient demonstrated compliance with WB precautions. Therapy will attempt steps with patient prior to discharge.  Patient would benefit from continued therapy in order to address deficits in strength, balance, range of motion and functional mobility  Treatment Diagnosis: difficutly walking  Therapy Prognosis: Good  Decision Making: Medium Complexity  Requires PT Follow-Up: Yes  Activity Tolerance  Activity Tolerance: Patient tolerated evaluation without incident     Plan   Physcial Therapy Plan  General Plan: 2 times a day 7 days a week (1 time per day on weekends)  Current Treatment Recommendations: Strengthening, ROM, Balance training, Functional mobility training, Transfer training, Gait training, Stair training, Neuromuscular re-education, Manual, Home exercise program, Safety education & training, Patient/Caregiver education & training, Equipment evaluation, education, & procurement, Positioning  Safety Devices  Type of Devices: Call light within reach, Left in chair (patient not on chair alarm prior to PT arrival)     Restrictions  Restrictions/Precautions  Restrictions/Precautions: Weight Bearing, General Precautions  Required Braces or Orthoses?: Yes  Lower Extremity Weight Bearing Restrictions  Left Lower Extremity Weight Bearing: Partial Weight Bearing  Partial Weight Bearing Percentage Or Pounds: 50% weight bearing  Required Braces or Orthoses  Left Lower Extremity Brace: Knee Immobilizer (per orders to be worn at all times)     Subjective   General  Chart Reviewed: Yes  Patient assessed for rehabilitation services?: Yes  Family / Caregiver Present: No  Referring Practitioner: Xavier Massey DO  Diagnosis: G04.347A  Subjective  Subjective: Patient denies pain and states more of a discomfort. Patient states he has been taking tylenol for pain as needed.          Social/Functional History  Social/Functional History  Lives With: Spouse  Type of Home: Condo  Home Layout: One level  Home Access: Stairs to enter with rails  Entrance Stairs - Number of Steps: 1  Bathroom Shower/Tub: Walk-in shower  Bathroom Toilet: Handicap height  Bathroom Equipment: Grab bars in shower  Home Equipment: mac Varela  Has the patient had two or more falls in the past year or any fall with injury in the past year?: No  ADL Assistance: Independent  Homemaking Assistance: Independent  Homemaking Responsibilities: No  Ambulation Assistance: Independent  Transfer Assistance: Independent  Active : Yes  Occupation: Full time employment  Type of Occupation: works for [employer] on [employer]  Additional Comments: Patient states PLOF independent with ADLs and ambulation without AD  Vision/Hearing  Vision  Vision: Within Functional Limits  Hearing  Hearing: Within functional limits    Cognition   Orientation  Overall Orientation Status: Within Normal Limits  Cognition  Overall Cognitive Status: WNL     Objective   Observation/Palpation  Observation: left immobilizer in place  Gross Assessment  AROM: Grossly decreased, non-functional (L LE not tested due to recent surgery and immobilizer in place, R LE WFL)  Strength: Grossly decreased, non-functional (L LE not tested due to recent surgery and immobilizer in place, R LE WFL)     Bed Mobility Training  Bed Mobility Training: No (patient sitting up in chair prior to PT arrival)  Balance  Sitting: Intact  Standing: Impaired  Standing - Static: Constant support  Standing - Dynamic: Constant support  Transfer Training  Transfer Training: Yes  Overall Level of Assistance: Contact-guard assistance  Interventions: Verbal cues  Sit to Stand: Contact-guard assistance  Stand to Sit: Contact-guard assistance  Gait Training  Gait Training: Yes  Left Side Weight Bearing: Partial (%) (50%)  Gait  Overall Level of Assistance: Contact-guard assistance  Distance (ft): 30 Feet  Assistive Device: Walker, rolling      AM-PAC Score     AM-PAC Inpatient Mobility without Stair Climbing Raw Score : 15 (01/29/23 1116)  AM-PAC Inpatient without Stair Climbing T-Scale Score : 43.03 (01/29/23 1116)  Mobility Inpatient CMS 0-100% Score: 47.43 (01/29/23 1116)  Mobility Inpatient without Stair CMS G-Code Modifier : CK (01/29/23 1116)      Goals  Short Term Goals  Time Frame for Short Term Goals: 10 visits  Short Term Goal 1: Patient will ambulate 150 feet with RW and supervision assistance in order to ease ADLS  Short Term Goal 2: Patient will ascend/descend 1 step with HR and supervision assistance in order to safely enter/exit the home  Short Term Goal 3: Patient will tolerate 35-45' ther-ex in order to increase endurance and ease ADLs       Education  Patient Education  Education Given To: Patient  Education Provided: Role of Therapy;Precautions; Equipment;Transfer Training  Education Method: Demonstration;Verbal  Barriers to Learning: None  Education Outcome: Verbalized understanding;Demonstrated understanding      Therapy Time   Individual Concurrent Group Co-treatment   Time In 0855         Time Out 0912         Minutes Jah Lopez 3, PT, DPT

## 2023-01-29 NOTE — PROGRESS NOTES
Writer changed patient's gown and patient is brushing his teeth at this time. He is complaining of 6/10 pain but only wanted tylenol. Tylenol was administered.

## 2023-01-29 NOTE — PLAN OF CARE
Problem: Discharge Planning  Goal: Discharge to home or other facility with appropriate resources  Outcome: Progressing  Flowsheets (Taken 1/28/2023 1930)  Discharge to home or other facility with appropriate resources:   Identify barriers to discharge with patient and caregiver   Arrange for needed discharge resources and transportation as appropriate   Identify discharge learning needs (meds, wound care, etc)   Refer to discharge planning if patient needs post-hospital services based on physician order or complex needs related to functional status, cognitive ability or social support system     Problem: Pain  Goal: Verbalizes/displays adequate comfort level or baseline comfort level  Outcome: Progressing     Problem: Safety - Adult  Goal: Free from fall injury  Outcome: Progressing     Problem: Nutrition Deficit:  Goal: Optimize nutritional status  Outcome: Progressing

## 2023-01-29 NOTE — DISCHARGE INSTRUCTIONS
Orthopedic discharge instructions:    Maintain left knee in full extension with the knee immobilizer. May remove the knee immobilizer and Ace wrap in order to shower, but leave the adhesive dressing over the incision intact. Keep the knee in full extension when doing so. 50% weightbearing on the left leg with the knee locked in full extension in the knee immobilizer and with use of the walker for stability. Ice and elevate the left knee to reduce pain and swelling. Pain control with Tylenol, Percocet as needed. Avoid taking more than 3000 to 4000 mg of acetaminophen within a 24-hour period. DVT prophylaxis with HEATH hose, mobilization, resume home Eliquis    Please call the orthopedic office with any questions or concerns - [phone]    Call the orthopedic office to schedule a postoperative appointment for approximately 2 weeks postop. [phone]    For more urgent matters or after hours, you may call Dr. Nils Solomon cell phone. [phone]    We will plan to begin formal physical therapy approximately 2 weeks out from surgery after seeing Dr. Aurora Mauricio for a postoperative appointment.

## 2023-02-11 ENCOUNTER — APPOINTMENT (OUTPATIENT)
Dept: GENERAL RADIOLOGY | Age: 78
DRG: 813 | End: 2023-02-11
Payer: MEDICARE

## 2023-02-11 ENCOUNTER — HOSPITAL ENCOUNTER (INPATIENT)
Age: 78
LOS: 1 days | Discharge: HOME | DRG: 813 | End: 2023-02-12
Attending: EMERGENCY MEDICINE | Admitting: INTERNAL MEDICINE
Payer: MEDICARE

## 2023-02-11 ENCOUNTER — APPOINTMENT (OUTPATIENT)
Dept: CT IMAGING | Age: 78
DRG: 813 | End: 2023-02-11
Payer: MEDICARE

## 2023-02-11 DIAGNOSIS — K62.5 RECTAL BLEEDING: Primary | ICD-10-CM

## 2023-02-11 DIAGNOSIS — K57.92 DIVERTICULITIS: ICD-10-CM

## 2023-02-11 LAB
ABSOLUTE EOS #: 0.03 K/UL (ref 0–0.44)
ABSOLUTE IMMATURE GRANULOCYTE: 0.16 K/UL (ref 0–0.3)
ABSOLUTE LYMPH #: 1.55 K/UL (ref 1.1–3.7)
ABSOLUTE MONO #: 0.65 K/UL (ref 0.1–1.2)
ALBUMIN SERPL-MCNC: 3.6 G/DL (ref 3.5–5.2)
ALBUMIN/GLOBULIN RATIO: 1.3 (ref 1–2.5)
ALP SERPL-CCNC: 67 U/L (ref 40–129)
ALT SERPL-CCNC: 25 U/L (ref 5–41)
ANION GAP SERPL CALCULATED.3IONS-SCNC: 9 MMOL/L (ref 9–17)
AST SERPL-CCNC: 16 U/L
BACTERIA: ABNORMAL
BASOPHILS # BLD: 0 % (ref 0–2)
BASOPHILS ABSOLUTE: 0.04 K/UL (ref 0–0.2)
BILIRUB SERPL-MCNC: 0.3 MG/DL (ref 0.3–1.2)
BILIRUBIN URINE: NEGATIVE
BUN SERPL-MCNC: 21 MG/DL (ref 8–23)
BUN/CREAT BLD: 22 (ref 9–20)
CALCIUM SERPL-MCNC: 9 MG/DL (ref 8.6–10.4)
CHLORIDE SERPL-SCNC: 103 MMOL/L (ref 98–107)
CO2 SERPL-SCNC: 23 MMOL/L (ref 20–31)
COLOR: YELLOW
CREAT SERPL-MCNC: 0.97 MG/DL (ref 0.7–1.2)
EKG ATRIAL RATE: 101 BPM
EKG P AXIS: 22 DEGREES
EKG P-R INTERVAL: 154 MS
EKG Q-T INTERVAL: 346 MS
EKG QRS DURATION: 82 MS
EKG QTC CALCULATION (BAZETT): 448 MS
EKG R AXIS: 45 DEGREES
EKG T AXIS: 42 DEGREES
EKG VENTRICULAR RATE: 101 BPM
EOSINOPHILS RELATIVE PERCENT: 0 % (ref 1–4)
EPITHELIAL CELLS UA: ABNORMAL /HPF (ref 0–5)
GFR SERPL CREATININE-BSD FRML MDRD: >60 ML/MIN/1.73M2
GLUCOSE SERPL-MCNC: 163 MG/DL (ref 70–99)
GLUCOSE UR STRIP.AUTO-MCNC: NEGATIVE MG/DL
HCT VFR BLD AUTO: 35.3 % (ref 40.7–50.3)
HGB BLD-MCNC: 12.1 G/DL (ref 13–17)
IMMATURE GRANULOCYTES: 1 %
INR PPP: 1.2
KETONES UR STRIP.AUTO-MCNC: NEGATIVE MG/DL
LEUKOCYTE ESTERASE UR QL STRIP.AUTO: NEGATIVE
LYMPHOCYTES # BLD: 13 % (ref 24–43)
MCH RBC QN AUTO: 32.5 PG (ref 25.2–33.5)
MCHC RBC AUTO-ENTMCNC: 34.3 G/DL (ref 28.4–34.8)
MCV RBC AUTO: 94.9 FL (ref 82.6–102.9)
MONOCYTES # BLD: 6 % (ref 3–12)
MUCUS: ABNORMAL
NITRITE UR QL STRIP.AUTO: NEGATIVE
NRBC AUTOMATED: 0 PER 100 WBC
PDW BLD-RTO: 12.8 % (ref 11.8–14.4)
PLATELET # BLD AUTO: 289 K/UL (ref 138–453)
PMV BLD AUTO: 9.3 FL (ref 8.1–13.5)
POTASSIUM SERPL-SCNC: 4.5 MMOL/L (ref 3.7–5.3)
PROT SERPL-MCNC: 6.3 G/DL (ref 6.4–8.3)
PROT UR STRIP.AUTO-MCNC: 6 MG/DL (ref 5–9)
PROT UR STRIP.AUTO-MCNC: NEGATIVE MG/DL
PROTHROMBIN TIME: 15.3 SEC (ref 11.5–14.2)
RBC # BLD: 3.72 M/UL (ref 4.21–5.77)
RBC CLUMPS #/AREA URNS AUTO: ABNORMAL /HPF (ref 0–2)
SEG NEUTROPHILS: 80 % (ref 36–65)
SEGMENTED NEUTROPHILS ABSOLUTE COUNT: 9.46 K/UL (ref 1.5–8.1)
SODIUM SERPL-SCNC: 135 MMOL/L (ref 135–144)
SPECIFIC GRAVITY UA: 1.02 (ref 1.01–1.02)
TROPONIN I SERPL DL<=0.01 NG/ML-MCNC: 15 NG/L (ref 0–22)
TURBIDITY: CLEAR
URINE HGB: NEGATIVE
UROBILINOGEN, URINE: NORMAL
WBC # BLD AUTO: 11.9 K/UL (ref 3.5–11.3)
WBC UA: ABNORMAL /HPF (ref 0–5)

## 2023-02-11 PROCEDURE — 84484 ASSAY OF TROPONIN QUANT: CPT

## 2023-02-11 PROCEDURE — 81001 URINALYSIS AUTO W/SCOPE: CPT

## 2023-02-11 PROCEDURE — 93010 ELECTROCARDIOGRAM REPORT: CPT | Performed by: INTERNAL MEDICINE

## 2023-02-11 PROCEDURE — 85025 COMPLETE CBC W/AUTO DIFF WBC: CPT

## 2023-02-11 PROCEDURE — 2580000003 HC RX 258: Performed by: INTERNAL MEDICINE

## 2023-02-11 PROCEDURE — 99285 EMERGENCY DEPT VISIT HI MDM: CPT

## 2023-02-11 PROCEDURE — 71045 X-RAY EXAM CHEST 1 VIEW: CPT

## 2023-02-11 PROCEDURE — 6360000004 HC RX CONTRAST MEDICATION: Performed by: EMERGENCY MEDICINE

## 2023-02-11 PROCEDURE — 80053 COMPREHEN METABOLIC PANEL: CPT

## 2023-02-11 PROCEDURE — 6360000002 HC RX W HCPCS: Performed by: EMERGENCY MEDICINE

## 2023-02-11 PROCEDURE — 93005 ELECTROCARDIOGRAM TRACING: CPT | Performed by: EMERGENCY MEDICINE

## 2023-02-11 PROCEDURE — 74177 CT ABD & PELVIS W/CONTRAST: CPT

## 2023-02-11 PROCEDURE — 2580000003 HC RX 258: Performed by: EMERGENCY MEDICINE

## 2023-02-11 PROCEDURE — 36415 COLL VENOUS BLD VENIPUNCTURE: CPT

## 2023-02-11 PROCEDURE — 6370000000 HC RX 637 (ALT 250 FOR IP): Performed by: INTERNAL MEDICINE

## 2023-02-11 PROCEDURE — 2500000003 HC RX 250 WO HCPCS: Performed by: EMERGENCY MEDICINE

## 2023-02-11 PROCEDURE — 96365 THER/PROPH/DIAG IV INF INIT: CPT

## 2023-02-11 PROCEDURE — 1200000000 HC SEMI PRIVATE

## 2023-02-11 PROCEDURE — 85610 PROTHROMBIN TIME: CPT

## 2023-02-11 PROCEDURE — 94761 N-INVAS EAR/PLS OXIMETRY MLT: CPT

## 2023-02-11 RX ORDER — SODIUM CHLORIDE 9 MG/ML
INJECTION, SOLUTION INTRAVENOUS PRN
Status: DISCONTINUED | OUTPATIENT
Start: 2023-02-11 | End: 2023-02-12 | Stop reason: HOSPADM

## 2023-02-11 RX ORDER — ACETAMINOPHEN 325 MG/1
650 TABLET ORAL EVERY 6 HOURS PRN
Status: DISCONTINUED | OUTPATIENT
Start: 2023-02-11 | End: 2023-02-12 | Stop reason: HOSPADM

## 2023-02-11 RX ORDER — METRONIDAZOLE 500 MG/100ML
500 INJECTION, SOLUTION INTRAVENOUS EVERY 8 HOURS
Status: DISCONTINUED | OUTPATIENT
Start: 2023-02-12 | End: 2023-02-12

## 2023-02-11 RX ORDER — ONDANSETRON 4 MG/1
4 TABLET, ORALLY DISINTEGRATING ORAL EVERY 8 HOURS PRN
Status: DISCONTINUED | OUTPATIENT
Start: 2023-02-11 | End: 2023-02-12 | Stop reason: HOSPADM

## 2023-02-11 RX ORDER — SODIUM CHLORIDE 0.9 % (FLUSH) 0.9 %
5-40 SYRINGE (ML) INJECTION EVERY 12 HOURS SCHEDULED
Status: DISCONTINUED | OUTPATIENT
Start: 2023-02-11 | End: 2023-02-12 | Stop reason: HOSPADM

## 2023-02-11 RX ORDER — SODIUM CHLORIDE 9 MG/ML
INJECTION, SOLUTION INTRAVENOUS CONTINUOUS
Status: DISCONTINUED | OUTPATIENT
Start: 2023-02-11 | End: 2023-02-12

## 2023-02-11 RX ORDER — SODIUM CHLORIDE 9 MG/ML
INJECTION, SOLUTION INTRAVENOUS CONTINUOUS
Status: DISCONTINUED | OUTPATIENT
Start: 2023-02-11 | End: 2023-02-12 | Stop reason: HOSPADM

## 2023-02-11 RX ORDER — ONDANSETRON 2 MG/ML
4 INJECTION INTRAMUSCULAR; INTRAVENOUS EVERY 6 HOURS PRN
Status: DISCONTINUED | OUTPATIENT
Start: 2023-02-11 | End: 2023-02-12 | Stop reason: HOSPADM

## 2023-02-11 RX ORDER — ACETAMINOPHEN 650 MG/1
650 SUPPOSITORY RECTAL EVERY 6 HOURS PRN
Status: DISCONTINUED | OUTPATIENT
Start: 2023-02-11 | End: 2023-02-12 | Stop reason: HOSPADM

## 2023-02-11 RX ORDER — SODIUM CHLORIDE 0.9 % (FLUSH) 0.9 %
10 SYRINGE (ML) INJECTION PRN
Status: DISCONTINUED | OUTPATIENT
Start: 2023-02-11 | End: 2023-02-12 | Stop reason: HOSPADM

## 2023-02-11 RX ORDER — METRONIDAZOLE 500 MG/100ML
500 INJECTION, SOLUTION INTRAVENOUS ONCE
Status: COMPLETED | OUTPATIENT
Start: 2023-02-11 | End: 2023-02-11

## 2023-02-11 RX ORDER — CIPROFLOXACIN 2 MG/ML
400 INJECTION, SOLUTION INTRAVENOUS ONCE
Status: COMPLETED | OUTPATIENT
Start: 2023-02-11 | End: 2023-02-11

## 2023-02-11 RX ORDER — CIPROFLOXACIN 2 MG/ML
400 INJECTION, SOLUTION INTRAVENOUS EVERY 12 HOURS
Status: DISCONTINUED | OUTPATIENT
Start: 2023-02-12 | End: 2023-02-12

## 2023-02-11 RX ORDER — POLYETHYLENE GLYCOL 3350 17 G/17G
17 POWDER, FOR SOLUTION ORAL DAILY PRN
Status: DISCONTINUED | OUTPATIENT
Start: 2023-02-11 | End: 2023-02-12 | Stop reason: HOSPADM

## 2023-02-11 RX ORDER — FEBUXOSTAT 40 MG/1
40 TABLET, FILM COATED ORAL DAILY
Status: DISCONTINUED | OUTPATIENT
Start: 2023-02-12 | End: 2023-02-12 | Stop reason: HOSPADM

## 2023-02-11 RX ADMIN — SODIUM CHLORIDE: 9 INJECTION, SOLUTION INTRAVENOUS at 19:51

## 2023-02-11 RX ADMIN — SODIUM CHLORIDE: 9 INJECTION, SOLUTION INTRAVENOUS at 22:08

## 2023-02-11 RX ADMIN — METRONIDAZOLE 500 MG: 500 INJECTION, SOLUTION INTRAVENOUS at 19:55

## 2023-02-11 RX ADMIN — SODIUM CHLORIDE, PRESERVATIVE FREE 10 ML: 5 INJECTION INTRAVENOUS at 22:10

## 2023-02-11 RX ADMIN — IOPAMIDOL 75 ML: 755 INJECTION, SOLUTION INTRAVENOUS at 17:43

## 2023-02-11 RX ADMIN — BUSPIRONE HYDROCHLORIDE 15 MG: 5 TABLET ORAL at 22:05

## 2023-02-11 RX ADMIN — CIPROFLOXACIN 400 MG: 2 INJECTION, SOLUTION INTRAVENOUS at 20:54

## 2023-02-11 ASSESSMENT — LIFESTYLE VARIABLES
HOW OFTEN DO YOU HAVE A DRINK CONTAINING ALCOHOL: 4 OR MORE TIMES A WEEK
HOW MANY STANDARD DRINKS CONTAINING ALCOHOL DO YOU HAVE ON A TYPICAL DAY: 1 OR 2

## 2023-02-11 ASSESSMENT — PAIN - FUNCTIONAL ASSESSMENT
PAIN_FUNCTIONAL_ASSESSMENT: NONE - DENIES PAIN
PAIN_FUNCTIONAL_ASSESSMENT: NONE - DENIES PAIN

## 2023-02-11 NOTE — ED PROVIDER NOTES
[facility] ED  EMERGENCY DEPARTMENT ENCOUNTER      Pt Name: Rowena Rico  MRN: [id number]  [place] 1945  Date of evaluation: 2/11/2023  Provider: Aurora Lam MD    CHIEF COMPLAINT       Chief Complaint   Patient presents with    Dizziness     Dizziness with bright red blood in stool. Patient takes eliquis 5mg twice daily         HISTORY OF PRESENT ILLNESS   (Location/Symptom, Timing/Onset, Context/Setting, Quality, Duration, Modifying Factors, Severity)  Note limiting factors. Rowena Rico is a 69 y.o. male who presents to the emergency department      73-year-old gentleman presented the emergency department for evaluation of bright red blood in his stool. He noticed this today. No bleeding other than with bowel movements. Some mild lower abdominal discomfort. No fevers or chills. No previous similar episodes. Patient is currently on Eliquis for a fib. Recent left knee surgery scheduled to follow-up next week with orthopedics patient has not had any UTIs. No recent antibiotic use other than 1 dose of antibiotics intraoperatively for his knee surgery. No recent travel. No known sick contacts. No other acute concerns. Patient does report having had colonoscopy in past.  No abnormalities that he is aware. Nursing Notes were reviewed. REVIEW OF SYSTEMS    (2-9 systems for level 4, 10 or more for level 5)     Review of Systems   All other systems reviewed and are negative. Except as noted above the remainder of the review of systems was reviewed and negative.        PAST MEDICAL HISTORY     Past Medical History:   Diagnosis Date    Atrial fibrillation [facility])     Erectile dysfunction     Gout     Hypertension     Obesity     SHELIA (obstructive sleep apnea)     PAF (paroxysmal atrial fibrillation) ([facility] [address]     Unspecified sleep apnea          SURGICAL HISTORY       Past Surgical History:   Procedure Laterality Date    CARDIAC ELECTROPHYSIOLOGY STUDY AND ABLATION  03/2020    [facility] COLONOSCOPY      EYE SURGERY Left 01/01/2010    detached retina    LEG MUSCLE SURGERY Left 1/28/2023    QUADRICEPS REPAIR performed by Harlee Mortimer, DO at [address] Left 01/28/2023         CURRENT MEDICATIONS       Previous Medications    BUSPIRONE (BUSPAR) 15 MG TABLET    Take 15 mg by mouth 2 times daily    ELIQUIS 5 MG TABS TABLET    Take 5 mg by mouth 2 times daily. LOSARTAN (COZAAR) 50 MG TABLET    Take 1 tablet by mouth daily    METOPROLOL SUCCINATE (TOPROL XL) 50 MG EXTENDED RELEASE TABLET    TAKE 1 TABLET DAILY    MULTIPLE VITAMINS-MINERALS (THERAPEUTIC MULTIVITAMIN-MINERALS) TABLET    Take 1 tablet by mouth daily    PREDNISONE (DELTASONE) 10 MG TABLET    Take 1 tablet by mouth 3 times daily as needed (Gout)    TADALAFIL (CIALIS) 20 MG TABLET    Take 1 tablet by mouth as needed for Erectile Dysfunction    ULORIC 40 MG TABS TABLET    TAKE 1 TABLET DAILY       ALLERGIES     Diltiazem hcl    FAMILY HISTORY       Family History   Problem Relation Age of Onset    Arthritis Mother     High Blood Pressure Mother     Diabetes Father     High Blood Pressure Father     Arthritis Sister     Arthritis Sister           SOCIAL HISTORY       Social History     Socioeconomic History    Marital status:      Spouse name: None    Number of children: None    Years of education: None    Highest education level: None   Tobacco Use    Smoking status: Never    Smokeless tobacco: Never   Substance and Sexual Activity    Alcohol use:  Yes     Alcohol/week: 7.0 standard drinks     Types: 7 Standard drinks or equivalent per week    Drug use: No       SCREENINGS        [place] Coma Scale  Eye Opening: Spontaneous  Best Verbal Response: Oriented  Best Motor Response: Obeys commands  [place] Coma Scale Score: 15               PHYSICAL EXAM    (up to 7 for level 4, 8 or more for level 5)     ED Triage Vitals [02/11/23 1632]   BP Temp Temp Source Heart Rate Resp SpO2 Height Weight   (!) 152/122 97.6 °F (36.4 °C) Tympanic (!) 102 22 97 % -- 225 lb (102.1 kg)       Physical Exam  Vitals and nursing note reviewed. Constitutional:       General: He is not in acute distress. Appearance: He is not toxic-appearing. Neurological:      Mental Status: He is alert. DIAGNOSTIC RESULTS     EKG: All EKG's are interpreted by the Emergency Department Physician who either signs or Co-signs this chart in the absence of a cardiologist.        RADIOLOGY:   Non-plain film images such as CT, Ultrasound and MRI are read by the radiologist. Plain radiographic images are visualized and preliminarily interpreted by the emergency physician with the below findings:      Interpretation per the Radiologist below, if available at the time of this note:    CT ABDOMEN PELVIS W IV CONTRAST Additional Contrast? None   Final Result   1. Diverticulosis worse in the sigmoid but no acute diverticulitis. No   evidence of focal or diffuse bowel wall thickening. No evidence of colonic   masses. Small bowel appears unremarkable. 2. No evidence of gallbladder or biliary disease.          XR CHEST PORTABLE   Final Result   No acute cardiopulmonary disease               ED BEDSIDE ULTRASOUND:   Performed by ED Physician - none    LABS:  Labs Reviewed   CBC WITH AUTO DIFFERENTIAL - Abnormal; Notable for the following components:       Result Value    WBC 11.9 (*)     RBC 3.72 (*)     Hemoglobin 12.1 (*)     Hematocrit 35.3 (*)     Seg Neutrophils 80 (*)     Lymphocytes 13 (*)     Eosinophils % 0 (*)     Immature Granulocytes 1 (*)     Segs Absolute 9.46 (*)     All other components within normal limits   COMPREHENSIVE METABOLIC PANEL - Abnormal; Notable for the following components:    Glucose 163 (*)     Bun/Cre Ratio 22 (*)     Total Protein 6.3 (*)     All other components within normal limits   PROTIME-INR - Abnormal; Notable for the following components:    Protime 15.3 (*)     All other components within normal limits   MICROSCOPIC URINALYSIS - Abnormal; Notable for the following components:    Bacteria, UA TRACE (*)     Mucus, UA TRACE (*)     All other components within normal limits   URINALYSIS WITH REFLEX TO CULTURE   TROPONIN       All other labs were within normal range or not returned as of this dictation. EMERGENCY DEPARTMENT COURSE and DIFFERENTIAL DIAGNOSIS/MDM:   Vitals:    Vitals:    02/11/23 1632 02/11/23 1754 02/11/23 1945   BP: (!) 152/122 (!) 111/45 123/78   Pulse: (!) 102 87 70   Resp: 22 20 20   Temp: 97.6 °F (36.4 °C)     TempSrc: Tympanic     SpO2: 97% 98% 97%   Weight: 225 lb (102.1 kg)             MDM  Number of Diagnoses or Management Options  Diverticulitis  Rectal bleeding  Diagnosis management comments: Results discussed with patient and spouse. Concern for Diverticulitis due to significant LLQ tenderness and elevated WBC's. Abx initiated and patient admitted monitoring of Abd pain and rectal bleeding        MIPS       REASSESSMENT          CRITICAL CARE TIME   Total Critical Care time was  minutes, excluding separately reportable procedures. There was a high probability of clinically significant/life threatening deterioration in the patient's condition which required my urgent intervention. CONSULTS:  None    PROCEDURES:  Unless otherwise noted below, none     Procedures        FINAL IMPRESSION      1. Rectal bleeding    2. Diverticulitis          DISPOSITION/PLAN   DISPOSITION Decision To Admit 02/11/2023 07:25:25 PM      PATIENT REFERRED TO:  No follow-up provider specified. DISCHARGE MEDICATIONS:  New Prescriptions    No medications on file     Controlled Substances Monitoring:     No flowsheet data found.     (Please note that portions of this note were completed with a voice recognition program.  Efforts were made to edit the dictations but occasionally words are mis-transcribed.)    Rachell Kent MD (electronically signed)  Attending Emergency Physician            Rachell Kent MD  02/11/23 2031

## 2023-02-12 VITALS
WEIGHT: 227.2 LBS | DIASTOLIC BLOOD PRESSURE: 77 MMHG | OXYGEN SATURATION: 96 % | SYSTOLIC BLOOD PRESSURE: 138 MMHG | BODY MASS INDEX: 30.77 KG/M2 | HEIGHT: 72 IN | RESPIRATION RATE: 18 BRPM | TEMPERATURE: 97.7 F | HEART RATE: 75 BPM

## 2023-02-12 PROBLEM — K92.2 ACUTE LOWER GI BLEEDING: Status: ACTIVE | Noted: 2023-02-12

## 2023-02-12 PROBLEM — K57.92 ACUTE DIVERTICULITIS: Status: RESOLVED | Noted: 2023-02-11 | Resolved: 2023-02-12

## 2023-02-12 LAB
ABSOLUTE EOS #: 0.08 K/UL (ref 0–0.44)
ABSOLUTE IMMATURE GRANULOCYTE: 0.13 K/UL (ref 0–0.3)
ABSOLUTE LYMPH #: 2.24 K/UL (ref 1.1–3.7)
ABSOLUTE MONO #: 0.63 K/UL (ref 0.1–1.2)
ALBUMIN SERPL-MCNC: 3.5 G/DL (ref 3.5–5.2)
ALBUMIN/GLOBULIN RATIO: 1.4 (ref 1–2.5)
ALP SERPL-CCNC: 64 U/L (ref 40–129)
ALT SERPL-CCNC: 22 U/L (ref 5–41)
ANION GAP SERPL CALCULATED.3IONS-SCNC: 7 MMOL/L (ref 9–17)
AST SERPL-CCNC: 14 U/L
BASOPHILS # BLD: 0 % (ref 0–2)
BASOPHILS ABSOLUTE: 0.04 K/UL (ref 0–0.2)
BILIRUB SERPL-MCNC: 0.6 MG/DL (ref 0.3–1.2)
BUN SERPL-MCNC: 19 MG/DL (ref 8–23)
BUN/CREAT BLD: 21 (ref 9–20)
CALCIUM SERPL-MCNC: 9 MG/DL (ref 8.6–10.4)
CHLORIDE SERPL-SCNC: 103 MMOL/L (ref 98–107)
CO2 SERPL-SCNC: 26 MMOL/L (ref 20–31)
CREAT SERPL-MCNC: 0.9 MG/DL (ref 0.7–1.2)
EOSINOPHILS RELATIVE PERCENT: 1 % (ref 1–4)
GFR SERPL CREATININE-BSD FRML MDRD: >60 ML/MIN/1.73M2
GLUCOSE SERPL-MCNC: 113 MG/DL (ref 70–99)
HCT VFR BLD AUTO: 32.4 % (ref 40.7–50.3)
HCT VFR BLD AUTO: 32.5 % (ref 40.7–50.3)
HGB BLD-MCNC: 11.1 G/DL (ref 13–17)
HGB BLD-MCNC: 11.1 G/DL (ref 13–17)
IMMATURE GRANULOCYTES: 1 %
LYMPHOCYTES # BLD: 23 % (ref 24–43)
MCH RBC QN AUTO: 32.4 PG (ref 25.2–33.5)
MCHC RBC AUTO-ENTMCNC: 34.2 G/DL (ref 28.4–34.8)
MCV RBC AUTO: 94.8 FL (ref 82.6–102.9)
MONOCYTES # BLD: 6 % (ref 3–12)
NRBC AUTOMATED: 0 PER 100 WBC
PDW BLD-RTO: 13.2 % (ref 11.8–14.4)
PLATELET # BLD AUTO: 254 K/UL (ref 138–453)
PMV BLD AUTO: 9.9 FL (ref 8.1–13.5)
POTASSIUM SERPL-SCNC: 4.6 MMOL/L (ref 3.7–5.3)
PROT SERPL-MCNC: 6 G/DL (ref 6.4–8.3)
RBC # BLD: 3.43 M/UL (ref 4.21–5.77)
SEG NEUTROPHILS: 69 % (ref 36–65)
SEGMENTED NEUTROPHILS ABSOLUTE COUNT: 6.73 K/UL (ref 1.5–8.1)
SODIUM SERPL-SCNC: 136 MMOL/L (ref 135–144)
WBC # BLD AUTO: 9.9 K/UL (ref 3.5–11.3)

## 2023-02-12 PROCEDURE — 85018 HEMOGLOBIN: CPT

## 2023-02-12 PROCEDURE — 94761 N-INVAS EAR/PLS OXIMETRY MLT: CPT

## 2023-02-12 PROCEDURE — 80053 COMPREHEN METABOLIC PANEL: CPT

## 2023-02-12 PROCEDURE — 2580000003 HC RX 258: Performed by: INTERNAL MEDICINE

## 2023-02-12 PROCEDURE — 2500000003 HC RX 250 WO HCPCS: Performed by: INTERNAL MEDICINE

## 2023-02-12 PROCEDURE — 85014 HEMATOCRIT: CPT

## 2023-02-12 PROCEDURE — 85025 COMPLETE CBC W/AUTO DIFF WBC: CPT

## 2023-02-12 PROCEDURE — 36415 COLL VENOUS BLD VENIPUNCTURE: CPT

## 2023-02-12 PROCEDURE — 6370000000 HC RX 637 (ALT 250 FOR IP): Performed by: INTERNAL MEDICINE

## 2023-02-12 RX ADMIN — BUSPIRONE HYDROCHLORIDE 15 MG: 5 TABLET ORAL at 09:03

## 2023-02-12 RX ADMIN — SODIUM CHLORIDE, PRESERVATIVE FREE 10 ML: 5 INJECTION INTRAVENOUS at 09:04

## 2023-02-12 RX ADMIN — METRONIDAZOLE 500 MG: 500 INJECTION, SOLUTION INTRAVENOUS at 03:03

## 2023-02-12 NOTE — H&P
Callum Reynolds M.D. Internal Medicine History and Phyisical    Patient: Daniella Workman  Date of Admission: 2/11/2023  4:25 PM  Date of Evaluation: 2/12/2023      Patient:  Daniella Workman  MRN: [id number]    Chief Complaint:    Chief Complaint   Patient presents with    Dizziness     Dizziness with bright red blood in stool. Patient takes eliquis 5mg twice daily       History Obtained From:  patient, electronic medical record    PCP: Kar Ranhdawa MD    History of Present Illness: The patient is a 69 y.o. male who presents with bright red blood in his stool. He noticed this today. No bleeding other than with bowel movements. Some mild lower abdominal discomfort. No fevers or chills. No previous similar episodes. Patient is currently on Eliquis for a fib. Recent left knee surgery scheduled to follow-up next week with orthopedics patient has not had any UTIs. No recent antibiotic use other than 1 dose of antibiotics intraoperatively for his knee surgery. No recent travel. No known sick contacts. No other acute concerns. Patient does report having had colonoscopy in past.  No abnormalities that he is aware.     Past Medical History:        Diagnosis Date    Atrial fibrillation (HCC)     Erectile dysfunction     Gout     Hypertension     Obesity     SHELIA (obstructive sleep apnea)     PAF (paroxysmal atrial fibrillation) ([facility] [address]     Unspecified sleep apnea        Past Surgical History:        Procedure Laterality Date    CARDIAC ELECTROPHYSIOLOGY STUDY AND ABLATION  03/2020    [facility]    COLONOSCOPY      EYE SURGERY Left 01/01/2010    detached retina    LEG MUSCLE SURGERY Left 01/28/2023    QUADRICEPS REPAIR performed by Colleen Phelps DO at [address] Left 01/28/2023    QUADRICEPS REPAIR Left 01/28/2023       Family History:       Problem Relation Age of Onset    Arthritis Mother     High Blood Pressure Mother     Diabetes Father     High Blood Pressure Father     Arthritis Sister Arthritis Sister        Social History:   TOBACCO:   reports that he has never smoked. He has never used smokeless tobacco.  ETOH:   reports current alcohol use of about 7.0 standard drinks per week. Allergies:  Percocet [oxycodone-acetaminophen] and Diltiazem hcl    Medications Prior to Admission:    Prior to Admission medications    Medication Sig Start Date End Date Taking? Authorizing Provider   losartan (COZAAR) 50 MG tablet Take 1 tablet by mouth daily 1/30/23   Preethi Canales MD   metoprolol succinate (TOPROL XL) 50 MG extended release tablet TAKE 1 TABLET DAILY 1/9/23   Historical Provider, MD   busPIRone (BUSPAR) 15 MG tablet Take 15 mg by mouth 2 times daily 11/12/22   Historical Provider, MD   Multiple Vitamins-Minerals (THERAPEUTIC MULTIVITAMIN-MINERALS) tablet Take 1 tablet by mouth daily    Historical Provider, MD   predniSONE (DELTASONE) 10 MG tablet Take 1 tablet by mouth 3 times daily as needed (Gout) 10/11/16   Duane Harari, MD   tadalafil (CIALIS) 20 MG tablet Take 1 tablet by mouth as needed for Erectile Dysfunction 9/1/16   Duane Harari, MD   ULORIC 40 MG TABS tablet TAKE 1 TABLET DAILY 5/13/16   Duane Harari, MD   ELIQUIS 5 MG TABS tablet Take 5 mg by mouth 2 times daily. 8/25/14   Historical Provider, MD       Review of Systems:  Constitutional:negative  for fevers, and negative for chills. Respiratory: negative for shortness of breath, negative for cough, and negative for wheezing  Cardiovascular: negative for chest pain, negative for palpitations, and negative for syncope  Gastrointestinal: negative for abdominal pain, negative for nausea,negative for vomiting, negative for diarrhea, negative for constipation, and positive for hematochezia or melena  Genitourinary: negative for dysuria, negative for urinary urgency, negative for urinary frequency, and negative for hematuria  Neurological: negative for unilateral weakness, numbness or tingling.     All other systems were reviewed with the patient and are negative except as stated above    Physical Exam:    Vitals:   Temp: 97.7 °F (36.5 °C)  BP: 138/77  Resp: 18  Heart Rate: 75  SpO2: 96 %  24HR INTAKE/OUTPUT:    Intake/Output Summary (Last 24 hours) at 2/12/2023 1033  Last data filed at 2/12/2023 0825  Gross per 24 hour   Intake 1438 ml   Output 900 ml   Net 538 ml       Exam:  GEN:  alert & appropriate appearance  EYES: normal eyes, non icteric  NECK: supple with no lymphadenopathy,  no bruits noted  PULM: clear with no rales or rhonchi.  No wheezes  COR: regular with rate and rhythm  ABD:  soft & NT, No distension  EXT:   no edema noted  NEURO: follows commands, RANDLE, no deficits  SKIN:  negative  -----------------------------------------------------------------  Diagnostic Data:     Complete Blood Count:   Recent Labs     02/11/23  1705 02/12/23  0625   WBC 11.9* 9.9   RBC 3.72* 3.43*   HGB 12.1* 11.1*   HCT 35.3* 32.5*   MCV 94.9 94.8   RDW 12.8 13.2    254      Recent Labs     02/11/23  1705 02/12/23  0625   SEGS 80* 69*   NEUTROABS 9.46* 6.73   LYMPHOPCT 13* 23*   LYMPHSABS 1.55 2.24   MONOPCT 6 6   EOSRELPCT 0* 1   BASOPCT 0 0   IMMGRAN 1* 1*       Recent Blood Glucose:   Recent Labs     02/11/23  1705 02/12/23  0625   GLUCOSE 163* 113*        Comprehensive Metabolic Profile:   Recent Labs     02/11/23  1705 02/12/23  0625   BUN 21 19   CREATININE 0.97 0.90    136   K 4.5 4.6    103   CALCIUM 9.0 9.0   ANIONGAP 9 7*   CO2 23 26   PROT 6.3* 6.0*   LABALBU 3.6 3.5   BILITOT 0.3 0.6   ALKPHOS 67 64   AST 16 14   ALT 25 22        UA:   Lab Results   Component Value Date    COLORU Yellow 02/11/2023    SPECGRAV 1.020 02/11/2023    WBCUA 0 TO 2 02/11/2023    RBCUA None 02/11/2023    EPITHUA 0 TO 2 02/11/2023    LEUKOCYTESUR NEGATIVE 02/11/2023    NITRU NEGATIVE 02/11/2023    GLUCOSEU NEGATIVE 02/11/2023    KETUA NEGATIVE 02/11/2023    PROTEINU NEGATIVE 02/11/2023    HGBUR NEGATIVE 02/11/2023    BACTERIA TRACE (A) 02/11/2023 Lactic Acid:   No results found for: LACTA     D-Dimer:  No results found for: DDIMER    PT/INR:  Lab Results   Component Value Date/Time    PROTIME 15.3 02/11/2023 05:05 PM    INR 1.2 02/11/2023 05:05 PM       High Sensitivity Troponin:  Recent Labs     02/11/23  1705   TROPHS 15       ABGs:   No results found for: PHART, PH, GSG5OJT, PCO2, PO2ART, PO2, CFD9WBD, HCO3, BEART, BE, THGBART, THB, XPY5RMR, J4BNLXJT, O2SAT, FIO2    EKG reviewed: my interpretation is: normal EKG, normal sinus rhythm    CT ABDOMEN PELVIS W IV CONTRAST Additional Contrast? None   Final Result   1. Diverticulosis worse in the sigmoid but no acute diverticulitis. No   evidence of focal or diffuse bowel wall thickening. No evidence of colonic   masses. Small bowel appears unremarkable. 2. No evidence of gallbladder or biliary disease. XR CHEST PORTABLE   Final Result   No acute cardiopulmonary disease             Assessment:    Acute lower GI bleeding    Principal Problem:    Acute lower GI bleeding from Eliquis. Suspect diverticular related. /Remote colonoscopy greater than 10 years  Active Problems:    Chronic anticoagulation  Resolved Problems:    Acute diverticulitis      Patient Active Problem List    Diagnosis Date Noted    Acute lower GI bleeding from Eliquis. Suspect diverticular related. /Remote colonoscopy greater than 10 years 02/12/2023    Quadriceps tendon rupture, left, initial encounter 01/27/2023    PAF (paroxysmal atrial fibrillation) ([facility] [address] 01/27/2023    SHELIA (obstructive sleep apnea) 01/27/2023    Preop cardiovascular exam 01/27/2023    History of atrial fibrillation 01/27/2023    S/P ablation of atrial fibrillation 01/27/2023    Chronic anticoagulation 01/27/2023    Left ventricular hypertrophy 01/27/2023    Uncontrolled hypertension 09/03/2015    Gout 02/01/2013    Palpitations 02/01/2013    ED (erectile dysfunction) 02/01/2013       Plan:      This patient requires inpatient admission because of GI bleed likely diverticular. I doubt diverticulitis. Factors affecting the medical complexity of this patient include Principal Problem:    Acute lower GI bleeding from Eliquis. Suspect diverticular related. /Remote colonoscopy greater than 10 years  Active Problems:    Chronic anticoagulation  Resolved Problems:    Acute diverticulitis    Estimated length of stay is 1 days  GI bleed stop. We will hold Eliquis. If blood count looks stable likely discharge later today. Outpatient follow-up and outpatient colonoscopy  Medication Monitoring / High Risk Medications: none     Primary Problem(s): Acute lower GI bleeding  Differential diagnoses: None  Condition is --MODERATE COMPLEXITY--  Condition is resolved  Treatment plan:  No imaging needed. Treatment plan is to hold Eliquis for 5 days  Imaging: no further imaging studies ordered today  Medications: Continue home meds  Medication Monitoring / High Risk Medications: none     [facility] Medication Reconciliation documentation:  [x] I have utilized all available immediate resources to obtain, update, or review the patient's current medications (including all prescriptions, over-the-counter products, herbals, cannabinoid products and bitamin/mineral/dietary/nutritional supplements. Jose E 'yes\"Dylon     []  The patient is not eligible for medication reconciliation; the patient is in an emergent medical situation where delaying treatment would jeopardize the patient's health    []  I did NOT confirm, update or review the patient's current list of medications today.   [DOES NOT SATISFY [facility] PERFORMANCE]        [facility] Advanced Care Planning documentation: (check appropriate boxes [x]  )  [x] I have confirmed that the patient's Advance Care Plan is present, Code Status is documented, or surrogate decision maker is listed in the patient's medical record  [If \"yes\", STOP HERE]     [] The patient's Advance Care Plan is NOT present because:    []  I confirmed today that the patient does not wish or was not able to name a   surrogate decision maker or provide and advance care plan.    [] Hospice care is currently being provided or has been provided within the   calendar year. []  I did NOT confirm today the presence of an [address] or surrogate   decision maker documented within the patient's medical record. [DOES NOT SATISFY [facility] PERFORMANCE]    CORE MEASURES  Core measures including DVT prophylaxis, Code Status, Nutrition, Therapy Options, chart reviewed and advance directives reviewed at this visit.     Mallory Mejias MD, MD  2/12/2023, 10:33 AM

## 2023-02-12 NOTE — DISCHARGE INSTRUCTIONS
Outpatient colonoscopy. He can resume Eliquis on Wednesday. Follow-up with primary care provider. Make sure you follow through with colonoscopy.

## 2023-02-12 NOTE — PROGRESS NOTES
Patient arrived at [facility] floor at this time via stretcher from ED. Patient ambulated to the bed with minimal assistance. Patient alert and oriented x4. Vitals and assessment completed at this time, as charted. Admission navigator completed. Patient oriented to room and call light at this time. Telemetry placed. Patient denies further needs. Call light within reach, will continue to monitor.

## 2023-02-12 NOTE — PROGRESS NOTES
Discharge instructions gone over with pt and pts wife. All questions and concerns answered. Pt denied any further needs at this time. Pt walked out to personal vehicle with wife via wheelchair.

## 2023-02-12 NOTE — DISCHARGE SUMMARY
Discharge Summary    Dilma Rubin  :  1945  MRN:  [id number]    Admit date:  2023      Discharge date:    2023    Admitting Physician:  Keturah Brizuela MD    Discharge Diagnoses:      Principal Problem:    Acute lower GI bleeding from Eliquis. Suspect diverticular related. /Remote colonoscopy greater than 10 years  Active Problems:    Chronic anticoagulation  Resolved Problems:    Acute diverticulitis      Active Hospital Problems    Diagnosis Date Noted    Acute lower GI bleeding from Eliquis. Suspect diverticular related. /Remote colonoscopy greater than 10 years [K92.2] 2023     Priority: Medium    Chronic anticoagulation [Z79.01] 2023     Priority: Medium       Discharge Medications:         Medication List        ASK your doctor about these medications      busPIRone 15 MG tablet  Commonly known as: BUSPAR     Eliquis 5 MG Tabs tablet  Generic drug: apixaban     losartan 50 MG tablet  Commonly known as: COZAAR  Take 1 tablet by mouth daily     metoprolol succinate 50 MG extended release tablet  Commonly known as: TOPROL XL     predniSONE 10 MG tablet  Commonly known as: DELTASONE  Take 1 tablet by mouth 3 times daily as needed (Gout)     tadalafil 20 MG tablet  Commonly known as: CIALIS  Take 1 tablet by mouth as needed for Erectile Dysfunction     therapeutic multivitamin-minerals tablet     Uloric 40 MG Tabs tablet  Generic drug: febuxostat  TAKE 1 TABLET DAILY              Consultants:  none     Hospital Course:   Dilma Rubin is a 69 y.o. male admitted with rectal bleeding short-term from Eliquis. Resolved in hospital.  Stable hemoglobin. Outpatient colonoscopy. Hold Eliquis for roughly 5 days. Exam: Regular. Clear. Soft abdomen. No edema.     Condition: Good    Disposition:   Home    DC summary time : 35 minutes    Patient will be followed by Ced Greco MD in 1-2 weeks    Signed:  Keturah Brizuela MD  2023, 10:37 AM

## 2023-02-12 NOTE — PROGRESS NOTES
Vitals and assessment done at this time. See flow sheet for more details. Pt resting in bed at this time. Pt denied any pain, shortness of breath, dizzy, and light headedness. Pt stated hes been passing gas fine. Pt denied any further needs at this time. Call light within reach. Will continue to monitor.

## 2023-02-12 NOTE — PROGRESS NOTES
Writer at bedside for reassessment. Patient sitting up in bed. Vitals obtained and assessment completed, see flowsheet for details. Urinal emptied. Medications given, see MAR for details. pt denies further needs at this time. Call light in reach.
